# Patient Record
Sex: FEMALE | Race: WHITE | Employment: FULL TIME | RURAL
[De-identification: names, ages, dates, MRNs, and addresses within clinical notes are randomized per-mention and may not be internally consistent; named-entity substitution may affect disease eponyms.]

---

## 2017-04-11 ENCOUNTER — OFFICE VISIT (OUTPATIENT)
Dept: FAMILY MEDICINE CLINIC | Age: 67
End: 2017-04-11

## 2017-04-11 VITALS
OXYGEN SATURATION: 98 % | SYSTOLIC BLOOD PRESSURE: 131 MMHG | HEIGHT: 66 IN | WEIGHT: 129 LBS | HEART RATE: 68 BPM | DIASTOLIC BLOOD PRESSURE: 60 MMHG | RESPIRATION RATE: 16 BRPM | BODY MASS INDEX: 20.73 KG/M2 | TEMPERATURE: 97.6 F

## 2017-04-11 DIAGNOSIS — J06.9 URI WITH COUGH AND CONGESTION: Primary | ICD-10-CM

## 2017-04-11 RX ORDER — AZITHROMYCIN 250 MG/1
TABLET, FILM COATED ORAL
Qty: 6 TAB | Refills: 0 | Status: SHIPPED | OUTPATIENT
Start: 2017-04-11 | End: 2017-04-16

## 2017-04-11 RX ORDER — BENZONATATE 100 MG/1
100 CAPSULE ORAL
Qty: 21 CAP | Refills: 0 | Status: SHIPPED | OUTPATIENT
Start: 2017-04-11 | End: 2017-04-18

## 2017-04-11 NOTE — MR AVS SNAPSHOT
Visit Information Date & Time Provider Department Dept. Phone Encounter #  
 4/11/2017  2:00 PM Rishi April, Jeffery Goldman [phone] Upcoming Health Maintenance Date Due DTaP/Tdap/Td series (1 - Tdap) 11/9/1971 ZOSTER VACCINE AGE 60> 11/9/2010 GLAUCOMA SCREENING Q2Y 11/9/2015 Pneumococcal 65+ Low/Medium Risk (1 of 2 - PCV13) 11/9/2015 MEDICARE YEARLY EXAM 11/9/2015 BREAST CANCER SCRN MAMMOGRAM 8/10/2018 COLONOSCOPY 8/30/2018 Allergies as of 4/11/2017  Review Complete On: 4/11/2017 By: Aiden Casanova LPN Severity Noted Reaction Type Reactions Pcn [Penicillins] Medium 09/26/2012   Side Effect Rash Clarithromycin  02/10/2012    Nausea Only Sulfa (Sulfonamide Antibiotics)  02/10/2012    Rash Current Immunizations  Never Reviewed Name Date Influenza Vaccine 10/15/2014 Not reviewed this visit You Were Diagnosed With   
  
 Codes Comments URI with cough and congestion    -  Primary ICD-10-CM: J06.9 ICD-9-CM: 465.9 Vitals BP Pulse Temp Resp Height(growth percentile) Weight(growth percentile) 131/60 (BP 1 Location: Right arm, BP Patient Position: Sitting) 68 97.6 °F (36.4 °C) (Temporal) 16 5' 6\" (1.676 m) 129 lb (58.5 kg) LMP SpO2 BMI OB Status Smoking Status 09/26/2003 98% 20.82 kg/m2 Postmenopausal Never Smoker Vitals History BMI and BSA Data Body Mass Index Body Surface Area  
 20.82 kg/m 2 1.65 m 2 Preferred Pharmacy Pharmacy Name Phone [facility] [phone] [facility] [address] [address] [facility] [address] Your Updated Medication List  
  
   
This list is accurate as of: 4/11/17  2:40 PM.  Always use your most recent med list.  
  
  
  
  
 ascorbic acid (vitamin C) 500 mg tablet Commonly known as:  VITAMIN C Take 500 mg by mouth daily. azithromycin 250 mg tablet Commonly known as:  Kathyetta Fossa Take 2 tablets today, then take 1 tablet daily  
  
 benzonatate 100 mg capsule Commonly known as:  TESSALON PERLES Take 1 Cap by mouth three (3) times daily as needed for Cough for up to 7 days. HYDROcodone-acetaminophen 5-325 mg per tablet Commonly known as:  Kriste Aidan Take 1 Tab by mouth every eight (8) hours as needed for Pain. KRILL OIL PO Take  by mouth. SUPER B COMPLEX + C 150 mg Tab Generic drug:  vitamin b comp & c no. 4 Take  by mouth daily. VITAMIN D3 1,000 unit Cap Generic drug:  cholecalciferol Take  by mouth daily. Prescriptions Sent to Pharmacy Refills  
 benzonatate (TESSALON PERLES) 100 mg capsule 0 Sig: Take 1 Cap by mouth three (3) times daily as needed for Cough for up to 7 days. Class: Normal  
 Pharmacy: [address] [place] [address] Ph #: [phone] Route: Oral  
 azithromycin (ZITHROMAX) 250 mg tablet 0 Sig: Take 2 tablets today, then take 1 tablet daily Class: Normal  
 Pharmacy: [address] [place] [address] Ph #: [phone] Patient Instructions Upper Respiratory Infection (Cold): Care Instructions Your Care Instructions An upper respiratory infection, or URI, is an infection of the nose, sinuses, or throat. URIs are spread by coughs, sneezes, and direct contact. The common cold is the most frequent kind of URI. The flu and sinus infections are other kinds of URIs. Almost all URIs are caused by viruses. Antibiotics won't cure them. But you can treat most infections with home care. This may include drinking lots of fluids and taking over-the-counter pain medicine. You will probably feel better in 4 to 10 days. The doctor has checked you carefully, but problems can develop later. If you notice any problems or new symptoms, get medical treatment right away. Follow-up care is a key part of your treatment and safety.  Be sure to make and go to all appointments, and call your doctor if you are having problems. It's also a good idea to know your test results and keep a list of the medicines you take. How can you care for yourself at home? · To prevent dehydration, drink plenty of fluids, enough so that your urine is light yellow or clear like water. Choose water and other caffeine-free clear liquids until you feel better. If you have kidney, heart, or liver disease and have to limit fluids, talk with your doctor before you increase the amount of fluids you drink. · Take an over-the-counter pain medicine, such as acetaminophen (Tylenol), ibuprofen (Advil, Motrin), or naproxen (Aleve). Read and follow all instructions on the label. · Before you use cough and cold medicines, check the label. These medicines may not be safe for young children or for people with certain health problems. · Be careful when taking over-the-counter cold or flu medicines and Tylenol at the same time. Many of these medicines have acetaminophen, which is Tylenol. Read the labels to make sure that you are not taking more than the recommended dose. Too much acetaminophen (Tylenol) can be harmful. · Get plenty of rest. 
· Do not smoke or allow others to smoke around you. If you need help quitting, talk to your doctor about stop-smoking programs and medicines. These can increase your chances of quitting for good. When should you call for help? Call 911 anytime you think you may need emergency care. For example, call if: 
· You have severe trouble breathing. Call your doctor now or seek immediate medical care if: 
· You seem to be getting much sicker. · You have new or worse trouble breathing. · You have a new or higher fever. · You have a new rash. Watch closely for changes in your health, and be sure to contact your doctor if: 
· You have a new symptom, such as a sore throat, an earache, or sinus pain. · You cough more deeply or more often, especially if you notice more mucus or a change in the color of your mucus. · You do not get better as expected. Where can you learn more? Go to http://dinesh-blaise.info/. Enter S917 in the search box to learn more about \"Upper Respiratory Infection (Cold): Care Instructions. \" Current as of: June 30, 2016 Content Version: 11.2 © 8708-9989 [employer]. Care instructions adapted under license by [employer] (which disclaims liability or warranty for this information). If you have questions about a medical condition or this instruction, always ask your healthcare professional. [address] any warranty or liability for your use of this information. Introducing [facility]! Dear Tara Myers: Thank you for requesting a Pathbrite account. Our records indicate that you already have an active Pathbrite account. You can access your account anytime at [email or web address] Did you know that you can access your hospital and ER discharge instructions at any time in Pathbrite? You can also review all of your test results from your hospital stay or ER visit. Additional Information If you have questions, please visit the Frequently Asked Questions section of the Pathbrite website at [email or web address] Remember, Pathbrite is NOT to be used for urgent needs. For medical emergencies, dial 911. Now available from your iPhone and Android! Please provide this summary of care documentation to your next provider. Your primary care clinician is listed as Sonya Silva. If you have any questions after today's visit, please call [phone].

## 2017-04-11 NOTE — PATIENT INSTRUCTIONS

## 2017-04-11 NOTE — PROGRESS NOTES
Identified pt with two pt identifiers(name and ). Chief Complaint   Patient presents with    Follow-up     pt reports that she had the flu the end of March and she doesn't feel like she is getting any better - reports that she caught it from a co-wkr but was not formally dx    Cough    Cough with sputum     is green in color         Health Maintenance Due   Topic    DTaP/Tdap/Td series (1 - Tdap)    ZOSTER VACCINE AGE 60>     GLAUCOMA SCREENING Q2Y     Pneumococcal 65+ Low/Medium Risk (1 of 2 - PCV13)    MEDICARE YEARLY EXAM        Wt Readings from Last 3 Encounters:   17 129 lb (58.5 kg)   16 130 lb (59 kg)   11/06/15 130 lb (59 kg)     Temp Readings from Last 3 Encounters:   16 97.8 °F (36.6 °C) (Oral)   11/06/15 98 °F (36.7 °C)   08/04/15 97.7 °F (36.5 °C) (Oral)     BP Readings from Last 3 Encounters:   16 126/49   11/06/15 112/52   08/04/15 130/62     Pulse Readings from Last 3 Encounters:   16 (!) 56   11/06/15 (!) 59   08/04/15 64         Learning Assessment:  :     Learning Assessment 2013   PRIMARY LEARNER Patient   HIGHEST LEVEL OF EDUCATION - PRIMARY LEARNER  2 YEARS OF COLLEGE   BARRIERS PRIMARY LEARNER NONE   CO-LEARNER CAREGIVER No   PRIMARY LANGUAGE ENGLISH   LEARNER PREFERENCE PRIMARY DEMONSTRATION     PICTURES   ANSWERED BY self   RELATIONSHIP SELF       Depression Screening:  :     PHQ 2 / 9, over the last two weeks 2016   Little interest or pleasure in doing things Not at all   Feeling down, depressed or hopeless Not at all   Total Score PHQ 2 0       Fall Risk Assessment:  :     Fall Risk Assessment, last 12 mths 2016   Able to walk? Yes   Fall in past 12 months? No       Abuse Screening:  :     Abuse Screening Questionnaire 2016   Do you ever feel afraid of your partner? N   Are you in a relationship with someone who physically or mentally threatens you? N   Is it safe for you to go home?  Y       Coordination of Care Questionnaire:  : 1) Have you been to an emergency room, urgent care clinic since your last visit? no   Hospitalized since your last visit? no             2) Have you seen or consulted any other health care providers outside of [address] [place] [address] since your last visit? yes, no  (Include any pap smears or colon screenings in this section.)    3) Do you have an Advance Directive on file? Yes, pt said she thinks she does on file at home  Are you interested in receiving information about Advance Directives? no    Patient is accompanied by self. Reviewed record in preparation for visit and have obtained necessary documentation. Medication reconciliation up to date and corrected with patient at this time.

## 2017-04-24 NOTE — PROGRESS NOTES
CC:  Chief Complaint   Patient presents with    Follow-up     pt reports that she had the flu the end of March and she doesn't feel like she is getting any better - reports that she caught it from a co-wkr but was not formally dx    Cough    Cough with sputum     is green in color     HISTORY OF [place]  Fina Iqbal is a 77 y.o. female. HPI Comments: Who presents today with worsening URI symptoms. She reports that she is coughing up large amounts of green colored sputum. She had been diagnosed with the flu in March and feels that her symptoms have progressed. Having tactile fevers and cough is persistent. Follow-up     Cough     Cough with sputum         ROS:  Review of Systems   Respiratory: Positive for cough and sputum production. All other systems reviewed and are negative. OBJECTIVE:  Visit Vitals    /60 (BP 1 Location: Right arm, BP Patient Position: Sitting)    Pulse 68    Temp 97.6 °F (36.4 °C) (Temporal)    Resp 16    Ht 5' 6\" (1.676 m)    Wt 129 lb (58.5 kg)    LMP 09/26/2003    SpO2 98%    BMI 20.82 kg/m2   Physical Exam   Constitutional: She is oriented to person, place, and time. Fatigued appearing. Cardiovascular: Normal rate and regular rhythm. Pulmonary/Chest: Effort normal. She has wheezes (few scattered and expiratory). Musculoskeletal: Normal range of motion. Neurological: She is alert and oriented to person, place, and time. Nursing note and vitals reviewed. ASSESSMENT and PLAN    ICD-10-CM ICD-9-CM    1. URI with cough and congestion J06.9 465.9 benzonatate (TESSALON PERLES) 100 mg capsule      azithromycin (ZITHROMAX) 250 mg tablet     66F who presents today with worsening URI, cough and congestion. She is having low grade, but persistent tactile fevers and has a sputum color change. Will treat as outlined above. Pt verbalizes understanding of plan of care and denies further questions or concerns at this time.     Follow-up Disposition:  Return if symptoms worsen or fail to improve.

## 2017-08-09 ENCOUNTER — OFFICE VISIT (OUTPATIENT)
Dept: FAMILY MEDICINE CLINIC | Age: 67
End: 2017-08-09

## 2017-08-09 VITALS
WEIGHT: 129.6 LBS | SYSTOLIC BLOOD PRESSURE: 107 MMHG | TEMPERATURE: 97.7 F | RESPIRATION RATE: 20 BRPM | BODY MASS INDEX: 20.83 KG/M2 | HEART RATE: 60 BPM | DIASTOLIC BLOOD PRESSURE: 53 MMHG | OXYGEN SATURATION: 99 % | HEIGHT: 66 IN

## 2017-08-09 DIAGNOSIS — Z00.00 ROUTINE MEDICAL EXAM: ICD-10-CM

## 2017-08-09 DIAGNOSIS — E55.9 VITAMIN D DEFICIENCY: ICD-10-CM

## 2017-08-09 DIAGNOSIS — Z23 ENCOUNTER FOR IMMUNIZATION: ICD-10-CM

## 2017-08-09 DIAGNOSIS — Z00.00 ROUTINE ADULT HEALTH MAINTENANCE: Primary | ICD-10-CM

## 2017-08-09 LAB
BILIRUB UR QL STRIP: NEGATIVE
GLUCOSE UR-MCNC: NEGATIVE MG/DL
KETONES P FAST UR STRIP-MCNC: NEGATIVE MG/DL
PH UR STRIP: 7 [PH] (ref 4.6–8)
PROT UR QL STRIP: NEGATIVE MG/DL
SP GR UR STRIP: 1.01 (ref 1–1.03)
UA UROBILINOGEN AMB POC: NORMAL (ref 0.2–1)
URINALYSIS CLARITY POC: CLEAR
URINALYSIS COLOR POC: YELLOW
URINE BLOOD POC: NEGATIVE
URINE LEUKOCYTES POC: NEGATIVE
URINE NITRITES POC: NEGATIVE

## 2017-08-09 NOTE — PROGRESS NOTES
Subjective:   77 y.o. female for Well Woman Check. Her gyne and breast care is done elsewhere by her Ob-Gyne physician. Patient Active Problem List    Diagnosis Date Noted    Hypercholesterolemia 06/28/2013    DDD (degenerative disc disease), lumbar 02/10/2012     Current Outpatient Prescriptions   Medication Sig Dispense Refill    pneumococcal 13 sonia conj dip (PREVNAR-13) 0.5 mL syrg injection 0.5 mL by IntraMUSCular route once for 1 dose. 0.5 mL 0    varicella zoster vacine live (ZOSTAVAX) 19,400 unit/0.65 mL susr injection 1 Vial by SubCUTAneous route once for 1 dose. 0.65 mL 0    ascorbic acid, vitamin C, (VITAMIN C) 500 mg tablet Take 500 mg by mouth daily.  Cholecalciferol, Vitamin D3, (VITAMIN D3) 1,000 unit cap Take  by mouth daily.  vitamin b comp & c no.4 (SUPER B COMPLEX + C) 150 mg tab Take  by mouth daily.  KRILL OIL PO Take  by mouth. Allergies   Allergen Reactions    Pcn [Penicillins] Rash    Clarithromycin Nausea Only    Sulfa (Sulfonamide Antibiotics) Rash     Past Medical History:   Diagnosis Date    Back pain     Chronic pain     lower back and into left hip     Past Surgical History:   Procedure Laterality Date    HX ORTHOPAEDIC  1995    L5-S1 discectomy laminectomy    HX TONSIL AND ADENOIDECTOMY      HX TUBAL LIGATION      [place] REMOVE LUMBAR ARTIFICIAL DISC, SINGLE      VASCULAR SURGERY PROCEDURE UNLIST Right     vein surgery     Family History   Problem Relation Age of Onset    Cancer Mother     No Known Problems Father      Social History   Substance Use Topics    Smoking status: Never Smoker    Smokeless tobacco: Never Used    Alcohol use No             Specific concerns today: discuss immunization up date. C/O increased hair loss. Taking Activia yogurt daily for bowel [place]. She went [facility] Filemon Wade this year. Will be seeing Dr. Awilda Reynolds for leg varicose veins. Review of Systems  Pertinent items are noted in HPI.     Objective:   Blood pressure 107/53, pulse 60, temperature 97.7 °F (36.5 °C), temperature source Oral, resp. rate 20, height 5' 6\" (1.676 m), weight 129 lb 9.6 oz (58.8 kg), last menstrual period 09/26/2003, SpO2 99 %. Physical Examination:   General appearance - alert, well appearing, and in no distress  Mental status - alert, oriented to person, place, and time  Ears - bilateral TM's and external ear canals normal  Nose - normal and patent, no erythema, discharge or polyps  Mouth - mucous membranes moist, pharynx normal without lesions  Neck - supple, no significant adenopathy  Chest - clear to auscultation, no wheezes, rales or rhonchi, symmetric air entry  Heart - normal rate, regular rhythm, normal S1, S2, no murmurs, rubs, clicks or gallops  Abdomen - soft, nontender, nondistended, no masses or organomegaly  Neurological - alert, oriented, normal speech, no focal findings or movement disorder noted  Musculoskeletal - no joint tenderness, deformity or swelling     Assessment/Plan:   Well woman exam.  increase physical activity, follow low fat diet, follow low salt diet, routine labs ordered    ICD-10-CM ICD-9-CM    1. Routine adult health maintenance Z00.00 V70.0 CBC W/O DIFF      METABOLIC PANEL, COMPREHENSIVE      LIPID PANEL      TSH 3RD GENERATION      HEMOGLOBIN A1C WITH EAG   2. Encounter for immunization Z23 V03.89 pneumococcal 13 sonia conj dip (PREVNAR-13) 0.5 mL syrg injection      varicella zoster vacine live (ZOSTAVAX) 19,400 unit/0.65 mL susr injection   3. Vitamin D deficiency E55.9 268.9 VITAMIN D, 25 HYDROXY   4. Routine medical exam Z00.00 V70.0 AMB POC URINALYSIS DIP STICK AUTO W/O MICRO     Patient Instructions        Preventing Osteoporosis: Care Instructions  Your Care Instructions  Osteoporosis means the bones are weak and thin enough that they can break easily. The older you are, the more likely you are to get osteoporosis.  But with plenty of calcium, vitamin D, and exercise, you can help prevent osteoporosis. The preteen and teen years are a key time for bone building. With the help of calcium, vitamin D, and exercise in those early years and beyond, the bones reach their peak density and strength by age 27. After age 27, your bones naturally start to thin and weaken. The stronger your bones are at around age 27, the lower your risk for osteoporosis. But no matter what your age and risk are, your bones still need calcium, vitamin D, and exercise to stay strong. Also avoid smoking, and limit alcohol. Smoking and heavy alcohol use can make your bones thinner. Talk to your doctor about any special risks you might have, such as having a close relative with osteoporosis or taking a medicine that can weaken bones. Your doctor can tell you the best ways to protect your bones from thinning. Follow-up care is a key part of your treatment and safety. Be sure to make and go to all appointments, and call your doctor if you are having problems. It's also a good idea to know your test results and keep a list of the medicines you take. How can you care for yourself at home? · Get enough calcium and vitamin D. The [place] of Medicine recommends adults younger than age 46 need 1,000 mg of calcium and 600 IU of vitamin D each day. Women ages 46 to 79 need 1,200 mg of calcium and 600 IU of vitamin D each day. Men ages 46 to 79 need 1,000 mg of calcium and 600 IU of vitamin D each day. Adults 71 and older need 1,200 mg of calcium and 800 IU of vitamin D each day. ¨ Eat foods rich in calcium, like yogurt, cheese, milk, and dark green vegetables. ¨ Eat foods rich in vitamin D, like eggs, fatty fish, cereal, and fortified milk. ¨ Get some sunshine. Your body uses sunshine to make its own vitamin D. The safest time to be out in the sun is before 10 a.m. or after 3 p.m. Avoid getting sunburned. Sunburn can increase your risk of skin cancer. ¨ Talk to your doctor about taking a calcium plus vitamin D supplement.  Ask about what type of calcium is right for you, and how much to take at a time. Adults ages 23 to 48 should not get more than 2,500 mg of calcium and 4,000 IU of vitamin D each day, whether it is from supplements and/or food. Adults ages 46 and older should not get more than 2,000 mg of calcium and 4,000 IU of vitamin D each day from supplements and/or food. · Get regular bone-building exercise. Weight-bearing and resistance exercises keep bones healthy by working the muscles and bones against gravity. Start out at an exercise level that feels right for you. Add a little at a time until you can do the following:  ¨ Do 30 minutes of weight-bearing exercise on most days of the week. Walking, jogging, stair climbing, and dancing are good choices. ¨ Do resistance exercises with weights or elastic bands 2 to 3 days a week. · Limit alcohol. Drink no more than 1 alcohol drink a day if you are a woman. Drink no more than 2 alcohol drinks a day if you are a man. · Do not smoke. Smoking can make bones thin faster. If you need help quitting, talk to your doctor about stop-smoking programs and medicines. These can increase your chances of quitting for good. When should you call for help? Watch closely for changes in your health, and be sure to contact your doctor if you have any problems. Where can you learn more? Go to http://dinesh-blaise.info/. Enter G535 in the search box to learn more about \"Preventing Osteoporosis: Care Instructions. \"  Current as of: August 9, 2016  Content Version: 11.3  © 5460-1769 [employer]. Care instructions adapted under license by [employer] (which disclaims liability or warranty for this information). If you have questions about a medical condition or this instruction, always ask your healthcare professional. [address] any warranty or liability for your use of this information.

## 2017-08-09 NOTE — MR AVS SNAPSHOT
Visit Information Date & Time Provider Department Dept. Phone Encounter #  
 2/5/5056 63:42 AM Linda RussellJeffery farah [phone] [id number] Upcoming Health Maintenance Date Due ZOSTER VACCINE AGE 60> 9/9/2010 GLAUCOMA SCREENING Q2Y 11/9/2015 Pneumococcal 65+ Low/Medium Risk (1 of 2 - PCV13) 11/9/2015 INFLUENZA AGE 9 TO ADULT 10/31/2017* MEDICARE YEARLY EXAM 8/10/2018 BREAST CANCER SCRN MAMMOGRAM 8/10/2018 COLONOSCOPY 8/30/2018 DTaP/Tdap/Td series (2 - Td) 8/9/2027 *Topic was postponed. The date shown is not the original due date. Allergies as of 8/9/2017  Review Complete On: 2/5/2565 By: Linda Stewart MD  
  
 Severity Noted Reaction Type Reactions Pcn [Penicillins] Medium 09/26/2012   Side Effect Rash Clarithromycin  02/10/2012    Nausea Only Sulfa (Sulfonamide Antibiotics)  02/10/2012    Rash Current Immunizations  Reviewed on 8/9/2017 Name Date Influenza Vaccine 10/15/2014 Reviewed by Linda Stewart MD on 8/9/2017 at 10:29 AM  
You Were Diagnosed With   
  
 Codes Comments Routine adult health maintenance    -  Primary ICD-10-CM: Z00.00 ICD-9-CM: V70.0 Encounter for immunization     ICD-10-CM: X85 ICD-9-CM: V03.89 Vitamin D deficiency     ICD-10-CM: E55.9 ICD-9-CM: 268.9 Vitals BP Pulse Temp Resp Height(growth percentile) Weight(growth percentile) 107/53 (BP 1 Location: Left arm, BP Patient Position: Sitting) 60 97.7 °F (36.5 °C) (Oral) 20 5' 6\" (1.676 m) 129 lb 9.6 oz (58.8 kg) LMP SpO2 BMI OB Status Smoking Status 09/26/2003 99% 20.92 kg/m2 Postmenopausal Never Smoker BMI and BSA Data Body Mass Index Body Surface Area  
 20.92 kg/m 2 1.65 m 2 Preferred Pharmacy Pharmacy Name Phone [facility] [phone] Your Updated Medication List  
  
   
 This list is accurate as of: 17 10:59 AM.  Always use your most recent med list.  
  
  
  
  
 ascorbic acid (vitamin C) 500 mg tablet Commonly known as:  VITAMIN C Take 500 mg by mouth daily. KRILL OIL PO Take  by mouth.  
  
 pneumococcal 13 sonia conj dip 0.5 mL Syrg injection Commonly known as:  PREVNAR-13  
0.5 mL by IntraMUSCular route once for 1 dose. SUPER B COMPLEX + C 150 mg Tab Generic drug:  vitamin b comp & c no. 4 Take  by mouth daily. varicella zoster vacine live 19,400 unit/0.65 mL Susr injection Commonly known as:  ZOSTAVAX  
1 Vial by SubCUTAneous route once for 1 dose. VITAMIN D3 1,000 unit Cap Generic drug:  cholecalciferol Take  by mouth daily. Prescriptions Printed Refills  
 pneumococcal 13 sonia conj dip (PREVNAR-13) 0.5 mL syrg injection 0 Si.5 mL by IntraMUSCular route once for 1 dose. Class: Print Route: IntraMUSCular  
 varicella zoster vacine live (ZOSTAVAX) 19,400 unit/0.65 mL susr injection 0 Si Vial by SubCUTAneous route once for 1 dose. Class: Print Route: SubCUTAneous We Performed the Following CBC W/O DIFF [07899 CPT(R)] HEMOGLOBIN A1C WITH EAG [19252 CPT(R)] LIPID PANEL [41538 CPT(R)] METABOLIC PANEL, COMPREHENSIVE [27943 CPT(R)] TSH 3RD GENERATION [25478 CPT(R)] VITAMIN D, 25 HYDROXY [id number] CPT(R)] To-Do List   
 2017 10:30 AM  
  Appointment with [facility] 1 at [place] [address] Mammography ([phone]) Shower or bathe using soap and water. Do not use deodorant, powder, perfumes, or lotion the day of your exam.  If your prior mammograms were not performed at [facility] please bring films with you or forward prior images 2 days before your procedure. Check in at registration 15min before your appointment time unless you were instructed to do otherwise.   A script is not necessary, but if you have one, please bring it on the day of the mammogram or have it faxed to the department. [facility] [phone] [facility]  [phone] [address] [place] [address]  [phone] [facility] [phone] [place] [address] [facility] [phone] Patient Instructions Preventing Osteoporosis: Care Instructions Your Care Instructions Osteoporosis means the bones are weak and thin enough that they can break easily. The older you are, the more likely you are to get osteoporosis. But with plenty of calcium, vitamin D, and exercise, you can help prevent osteoporosis. The preteen and teen years are a key time for bone building. With the help of calcium, vitamin D, and exercise in those early years and beyond, the bones reach their peak density and strength by age 27. After age 27, your bones naturally start to thin and weaken. The stronger your bones are at around age 27, the lower your risk for osteoporosis. But no matter what your age and risk are, your bones still need calcium, vitamin D, and exercise to stay strong. Also avoid smoking, and limit alcohol. Smoking and heavy alcohol use can make your bones thinner. Talk to your doctor about any special risks you might have, such as having a close relative with osteoporosis or taking a medicine that can weaken bones. Your doctor can tell you the best ways to protect your bones from thinning. Follow-up care is a key part of your treatment and safety. Be sure to make and go to all appointments, and call your doctor if you are having problems. It's also a good idea to know your test results and keep a list of the medicines you take. How can you care for yourself at home? · Get enough calcium and vitamin D. The [place] of Medicine recommends adults younger than age 46 need 1,000 mg of calcium and 600 IU of vitamin D each day. Women ages 46 to 79 need 1,200 mg of calcium and 600 IU of vitamin D each day.  Men ages 46 to 79 need 1,000 mg of calcium and 600 IU of vitamin D each day. Adults 71 and older need 1,200 mg of calcium and 800 IU of vitamin D each day. ¨ Eat foods rich in calcium, like yogurt, cheese, milk, and dark green vegetables. ¨ Eat foods rich in vitamin D, like eggs, fatty fish, cereal, and fortified milk. ¨ Get some sunshine. Your body uses sunshine to make its own vitamin D. The safest time to be out in the sun is before 10 a.m. or after 3 p.m. Avoid getting sunburned. Sunburn can increase your risk of skin cancer. ¨ Talk to your doctor about taking a calcium plus vitamin D supplement. Ask about what type of calcium is right for you, and how much to take at a time. Adults ages 23 to 48 should not get more than 2,500 mg of calcium and 4,000 IU of vitamin D each day, whether it is from supplements and/or food. Adults ages 46 and older should not get more than 2,000 mg of calcium and 4,000 IU of vitamin D each day from supplements and/or food. · Get regular bone-building exercise. Weight-bearing and resistance exercises keep bones healthy by working the muscles and bones against gravity. Start out at an exercise level that feels right for you. Add a little at a time until you can do the following: ¨ Do 30 minutes of weight-bearing exercise on most days of the week. Walking, jogging, stair climbing, and dancing are good choices. ¨ Do resistance exercises with weights or elastic bands 2 to 3 days a week. · Limit alcohol. Drink no more than 1 alcohol drink a day if you are a woman. Drink no more than 2 alcohol drinks a day if you are a man. · Do not smoke. Smoking can make bones thin faster. If you need help quitting, talk to your doctor about stop-smoking programs and medicines. These can increase your chances of quitting for good. When should you call for help? Watch closely for changes in your health, and be sure to contact your doctor if you have any problems. Where can you learn more? Go to http://david.info/. Enter A760 in the search box to learn more about \"Preventing Osteoporosis: Care Instructions. \" Current as of: August 9, 2016 Content Version: 11.3 © 1315-7972 PDV. Care instructions adapted under license by [employer] (which disclaims liability or warranty for this information). If you have questions about a medical condition or this instruction, always ask your healthcare professional. [address] any warranty or liability for your use of this information. Introducing [facility]! Dear Jose sifuentes: Thank you for requesting a Gamar account. Our records indicate that you already have an active Gamar account. You can access your account anytime at [email or web address] Did you know that you can access your hospital and ER discharge instructions at any time in Gamar? You can also review all of your test results from your hospital stay or ER visit. Additional Information If you have questions, please visit the Frequently Asked Questions section of the Gamar website at [email or web address] Remember, Gamar is NOT to be used for urgent needs. For medical emergencies, dial 911. Now available from your iPhone and Android! Please provide this summary of care documentation to your next provider. Your primary care clinician is listed as Laya Ross. If you have any questions after today's visit, please call [phone].

## 2017-08-09 NOTE — PATIENT INSTRUCTIONS
Preventing Osteoporosis: Care Instructions  Your Care Instructions  Osteoporosis means the bones are weak and thin enough that they can break easily. The older you are, the more likely you are to get osteoporosis. But with plenty of calcium, vitamin D, and exercise, you can help prevent osteoporosis. The preteen and teen years are a key time for bone building. With the help of calcium, vitamin D, and exercise in those early years and beyond, the bones reach their peak density and strength by age 27. After age 27, your bones naturally start to thin and weaken. The stronger your bones are at around age 27, the lower your risk for osteoporosis. But no matter what your age and risk are, your bones still need calcium, vitamin D, and exercise to stay strong. Also avoid smoking, and limit alcohol. Smoking and heavy alcohol use can make your bones thinner. Talk to your doctor about any special risks you might have, such as having a close relative with osteoporosis or taking a medicine that can weaken bones. Your doctor can tell you the best ways to protect your bones from thinning. Follow-up care is a key part of your treatment and safety. Be sure to make and go to all appointments, and call your doctor if you are having problems. It's also a good idea to know your test results and keep a list of the medicines you take. How can you care for yourself at home? · Get enough calcium and vitamin D. The [place] of Medicine recommends adults younger than age 46 need 1,000 mg of calcium and 600 IU of vitamin D each day. Women ages 46 to 79 need 1,200 mg of calcium and 600 IU of vitamin D each day. Men ages 46 to 79 need 1,000 mg of calcium and 600 IU of vitamin D each day. Adults 71 and older need 1,200 mg of calcium and 800 IU of vitamin D each day. ¨ Eat foods rich in calcium, like yogurt, cheese, milk, and dark green vegetables.   ¨ Eat foods rich in vitamin D, like eggs, fatty fish, cereal, and fortified milk.  ¨ Get some sunshine. Your body uses sunshine to make its own vitamin D. The safest time to be out in the sun is before 10 a.m. or after 3 p.m. Avoid getting sunburned. Sunburn can increase your risk of skin cancer. ¨ Talk to your doctor about taking a calcium plus vitamin D supplement. Ask about what type of calcium is right for you, and how much to take at a time. Adults ages 23 to 48 should not get more than 2,500 mg of calcium and 4,000 IU of vitamin D each day, whether it is from supplements and/or food. Adults ages 46 and older should not get more than 2,000 mg of calcium and 4,000 IU of vitamin D each day from supplements and/or food. · Get regular bone-building exercise. Weight-bearing and resistance exercises keep bones healthy by working the muscles and bones against gravity. Start out at an exercise level that feels right for you. Add a little at a time until you can do the following:  ¨ Do 30 minutes of weight-bearing exercise on most days of the week. Walking, jogging, stair climbing, and dancing are good choices. ¨ Do resistance exercises with weights or elastic bands 2 to 3 days a week. · Limit alcohol. Drink no more than 1 alcohol drink a day if you are a woman. Drink no more than 2 alcohol drinks a day if you are a man. · Do not smoke. Smoking can make bones thin faster. If you need help quitting, talk to your doctor about stop-smoking programs and medicines. These can increase your chances of quitting for good. When should you call for help? Watch closely for changes in your health, and be sure to contact your doctor if you have any problems. Where can you learn more? Go to http://dinesh-blaise.info/. Enter E611 in the search box to learn more about \"Preventing Osteoporosis: Care Instructions. \"  Current as of: August 9, 2016  Content Version: 11.3  © 8469-8412 [employer].  Care instructions adapted under license by [employer] (which disclaims liability or warranty for this information). If you have questions about a medical condition or this instruction, always ask your healthcare professional. [address] any warranty or liability for your use of this information.

## 2017-08-09 NOTE — LETTER
8/14/2017 5:48 PM 
 
Ms. Garrison Faes [address] [place] [id number] Dear Bladimir Frank: Please find your most recent results below. Resulted Orders CBC W/O DIFF Result Value Ref Range WBC 5.1 3.4 - 10.8 x10E3/uL  
 RBC 4.40 3.77 - 5.28 x10E6/uL HGB 12.9 11.1 - 15.9 g/dL HCT 40.5 34.0 - 46.6 % MCV 92 79 - 97 fL  
 MCH 29.3 26.6 - 33.0 pg  
 MCHC 31.9 31.5 - 35.7 g/dL  
 RDW 13.2 12.3 - 15.4 % PLATELET 101 339 - 864 x10E3/uL Narrative Performed at:  [address] [address]  [id number] : Maria Branch MD, Phone:  [phone] METABOLIC PANEL, COMPREHENSIVE Result Value Ref Range Glucose 82 65 - 99 mg/dL BUN 15 8 - 27 mg/dL Creatinine 0.67 0.57 - 1.00 mg/dL GFR est non-AA 92 >59 mL/min/1.73 GFR est  >59 mL/min/1.73  
 BUN/Creatinine ratio 22 12 - 28 Sodium 142 134 - 144 mmol/L Potassium 4.8 3.5 - 5.2 mmol/L Chloride 99 96 - 106 mmol/L  
 CO2 27 18 - 29 mmol/L Calcium 9.9 8.7 - 10.3 mg/dL Protein, total 7.4 6.0 - 8.5 g/dL Albumin 4.7 3.6 - 4.8 g/dL GLOBULIN, TOTAL 2.7 1.5 - 4.5 g/dL A-G Ratio 1.7 1.2 - 2.2 Bilirubin, total 0.3 0.0 - 1.2 mg/dL Alk. phosphatase 72 39 - 117 IU/L  
 AST (SGOT) 29 0 - 40 IU/L  
 ALT (SGPT) 22 0 - 32 IU/L Narrative Performed at:  [address] [address]  [id number] : Maria Branch MD, Phone:  [phone] LIPID PANEL Result Value Ref Range Cholesterol, total 222 (H) 100 - 199 mg/dL Triglyceride 79 0 - 149 mg/dL HDL Cholesterol 85 >39 mg/dL VLDL, calculated 16 5 - 40 mg/dL LDL, calculated 121 (H) 0 - 99 mg/dL Narrative Performed at:  [address] [address]  [id number] : Maria Branch MD, Phone:  [phone] TSH 3RD GENERATION Result Value Ref Range TSH 2.200 0.450 - 4.500 uIU/mL Narrative Performed at:  [address]  [id number] : Milton Rogers MD, Phone:  [phone] VITAMIN D, 25 HYDROXY Result Value Ref Range VITAMIN D, 25-HYDROXY 26.3 (L) 30.0 - 100.0 ng/mL Comment:  
   Vitamin D deficiency has been defined by the [facility] practice guideline as a 
level of serum 25-OH vitamin D less than 20 ng/mL (1,2). The Endocrine Society went on to further define vitamin D 
insufficiency as a level between 21 and 29 ng/mL (2). 1. IOM ([employer] of Medicine). 2010. Dietary reference 
   intakes for calcium and D. [facility]: The 
   The [employer] (AMI). 2. Glenna MF, Federico ARIZMENDI, Breanna DOE, et al. 
   Evaluation, treatment, and prevention of vitamin D 
   deficiency: an Endocrine Society clinical practice 
   guideline. JCEM. 2011 Jul; 96(7):1911-30. Narrative Performed at:  [address]  [id number] : Milton Rogers MD, Phone:  [phone] HEMOGLOBIN A1C WITH EAG Result Value Ref Range Hemoglobin A1c 5.8 (H) 4.8 - 5.6 % Comment:  
            Pre-diabetes: 5.7 - 6.4 Diabetes: >6.4 Glycemic control for adults with diabetes: <7.0 Estimated average glucose 120 mg/dL Narrative Performed at:  [address]  [id number] : Milton Rogers MD, Phone:  [phone] AMB POC URINALYSIS DIP STICK AUTO W/O MICRO Result Value Ref Range Color (UA POC) Yellow Clarity (UA POC) Clear Glucose (UA POC) Negative Negative Bilirubin (UA POC) Negative Negative Ketones (UA POC) Negative Negative Specific gravity (UA POC) 1.015 1.001 - 1.035 Blood (UA POC) Negative Negative pH (UA POC) 7.0 4.6 - 8.0 Protein (UA POC) Negative Negative mg/dL Urobilinogen (UA POC) 0.2 mg/dL 0.2 - 1 Nitrites (UA POC) Negative Negative Leukocyte esterase (UA POC) Negative Negative CVD REPORT Result Value Ref Range INTERPRETATION Note Comment:  
   Supplement report is available. Narrative Performed at:  [address] [place] [address]  [id number] : Sabrina Zepeda PhD, Phone:  [phone] RECOMMENDATIONS: 
Labs look good except for persistent elevated cholesterol numbers. Watch diet.  Vit D is low. Double up on dose of supplement to Vit D 2,000 units daily. Normal blood sugar, liver, kidney, and thyroid tests. Normal blood counts. Please call me if you have any questions: [phone] Sincerely, Jay Bardales MD

## 2017-08-09 NOTE — PROGRESS NOTES
Identified pt with two pt identifiers(name and ). Chief Complaint   Patient presents with    Physical        Health Maintenance Due   Topic    ZOSTER VACCINE AGE 60>     GLAUCOMA SCREENING Q2Y     Pneumococcal 65+ Low/Medium Risk (1 of 2 - PCV13)    INFLUENZA AGE 9 TO ADULT    Eye exam Dr Adolfo Mendoza   Flu vaccine given at work     Wt Readings from Last 3 Encounters:   17 129 lb 9.6 oz (58.8 kg)   17 129 lb (58.5 kg)   16 130 lb (59 kg)     Temp Readings from Last 3 Encounters:   17 97.7 °F (36.5 °C) (Oral)   17 97.6 °F (36.4 °C) (Temporal)   16 97.8 °F (36.6 °C) (Oral)     BP Readings from Last 3 Encounters:   17 107/53   17 131/60   16 126/49     Pulse Readings from Last 3 Encounters:   17 60   17 68   16 (!) 56         Learning Assessment:  :     Learning Assessment 2013   PRIMARY LEARNER Patient   HIGHEST LEVEL OF EDUCATION - PRIMARY LEARNER  2 YEARS OF COLLEGE   BARRIERS PRIMARY LEARNER NONE   CO-LEARNER CAREGIVER No   PRIMARY LANGUAGE ENGLISH   LEARNER PREFERENCE PRIMARY DEMONSTRATION     PICTURES   ANSWERED BY self   RELATIONSHIP SELF       Depression Screening:  :     PHQ over the last two weeks 2017   Little interest or pleasure in doing things Not at all   Feeling down, depressed or hopeless Not at all   Total Score PHQ 2 0       Fall Risk Assessment:  :     Fall Risk Assessment, last 12 mths 2017   Able to walk? Yes   Fall in past 12 months? No       Abuse Screening:  :     Abuse Screening Questionnaire 2017   Do you ever feel afraid of your partner? N N   Are you in a relationship with someone who physically or mentally threatens you? N N   Is it safe for you to go home?  Y Y       Coordination of Care Questionnaire:  :     1) Have you been to an emergency room, urgent care clinic since your last visit? no   Hospitalized since your last visit? no             2) Have you seen or consulted any other health care providers outside of [address] [place] [address] since your last visit? yes GYN -Dr Nawaf Khan (Include any pap smears or colon screenings in this section.)    3) Do you have an Advance Directive on file? No She has her own    Are you interested in receiving information about Advance Directives? no    Reviewed record in preparation for visit and have obtained necessary documentation. Medication reconciliation up to date and corrected with patient at this time.      Results for orders placed or performed in visit on 08/09/17   AMB POC URINALYSIS DIP STICK AUTO W/O MICRO   Result Value Ref Range    Color (UA POC) Yellow     Clarity (UA POC) Clear     Glucose (UA POC) Negative Negative    Bilirubin (UA POC) Negative Negative    Ketones (UA POC) Negative Negative    Specific gravity (UA POC) 1.015 1.001 - 1.035    Blood (UA POC) Negative Negative    pH (UA POC) 7.0 4.6 - 8.0    Protein (UA POC) Negative Negative mg/dL    Urobilinogen (UA POC) 0.2 mg/dL 0.2 - 1    Nitrites (UA POC) Negative Negative    Leukocyte esterase (UA POC) Negative Negative

## 2017-08-10 LAB
25(OH)D3+25(OH)D2 SERPL-MCNC: 26.3 NG/ML (ref 30–100)
ALBUMIN SERPL-MCNC: 4.7 G/DL (ref 3.6–4.8)
ALBUMIN/GLOB SERPL: 1.7 {RATIO} (ref 1.2–2.2)
ALP SERPL-CCNC: 72 IU/L (ref 39–117)
ALT SERPL-CCNC: 22 IU/L (ref 0–32)
AST SERPL-CCNC: 29 IU/L (ref 0–40)
BILIRUB SERPL-MCNC: 0.3 MG/DL (ref 0–1.2)
BUN SERPL-MCNC: 15 MG/DL (ref 8–27)
BUN/CREAT SERPL: 22 (ref 12–28)
CALCIUM SERPL-MCNC: 9.9 MG/DL (ref 8.7–10.3)
CHLORIDE SERPL-SCNC: 99 MMOL/L (ref 96–106)
CHOLEST SERPL-MCNC: 222 MG/DL (ref 100–199)
CO2 SERPL-SCNC: 27 MMOL/L (ref 18–29)
CREAT SERPL-MCNC: 0.67 MG/DL (ref 0.57–1)
ERYTHROCYTE [DISTWIDTH] IN BLOOD BY AUTOMATED COUNT: 13.2 % (ref 12.3–15.4)
EST. AVERAGE GLUCOSE BLD GHB EST-MCNC: 120 MG/DL
GLOBULIN SER CALC-MCNC: 2.7 G/DL (ref 1.5–4.5)
GLUCOSE SERPL-MCNC: 82 MG/DL (ref 65–99)
HBA1C MFR BLD: 5.8 % (ref 4.8–5.6)
HCT VFR BLD AUTO: 40.5 % (ref 34–46.6)
HDLC SERPL-MCNC: 85 MG/DL
HGB BLD-MCNC: 12.9 G/DL (ref 11.1–15.9)
INTERPRETATION, 910389: NORMAL
LDLC SERPL CALC-MCNC: 121 MG/DL (ref 0–99)
MCH RBC QN AUTO: 29.3 PG (ref 26.6–33)
MCHC RBC AUTO-ENTMCNC: 31.9 G/DL (ref 31.5–35.7)
MCV RBC AUTO: 92 FL (ref 79–97)
PLATELET # BLD AUTO: 273 X10E3/UL (ref 150–379)
POTASSIUM SERPL-SCNC: 4.8 MMOL/L (ref 3.5–5.2)
PROT SERPL-MCNC: 7.4 G/DL (ref 6–8.5)
RBC # BLD AUTO: 4.4 X10E6/UL (ref 3.77–5.28)
SODIUM SERPL-SCNC: 142 MMOL/L (ref 134–144)
TRIGL SERPL-MCNC: 79 MG/DL (ref 0–149)
TSH SERPL DL<=0.005 MIU/L-ACNC: 2.2 UIU/ML (ref 0.45–4.5)
VLDLC SERPL CALC-MCNC: 16 MG/DL (ref 5–40)
WBC # BLD AUTO: 5.1 X10E3/UL (ref 3.4–10.8)

## 2017-08-14 ENCOUNTER — HOSPITAL ENCOUNTER (OUTPATIENT)
Dept: MAMMOGRAPHY | Age: 67
Discharge: HOME | End: 2017-08-14
Attending: OBSTETRICS & GYNECOLOGY
Payer: COMMERCIAL

## 2017-08-14 DIAGNOSIS — Z12.31 VISIT FOR SCREENING MAMMOGRAM: ICD-10-CM

## 2017-08-14 PROCEDURE — 77063 BREAST TOMOSYNTHESIS BI: CPT

## 2017-08-14 NOTE — PROGRESS NOTES
Labs look good except for persistent elevated cholesterol numbers. Watch diet. Vit D is low. Double up on dose of supplement to Vit D 2,000 units daily. Normal blood sugar, liver, kidney, and thyroid tests. Normal blood counts.

## 2017-08-16 ENCOUNTER — TELEPHONE (OUTPATIENT)
Dept: FAMILY MEDICINE CLINIC | Age: 67
End: 2017-08-16

## 2018-04-16 ENCOUNTER — HOSPITAL ENCOUNTER (OUTPATIENT)
Dept: GENERAL RADIOLOGY | Age: 68
Discharge: HOME | End: 2018-04-16
Attending: FAMILY MEDICINE
Payer: COMMERCIAL

## 2018-04-16 ENCOUNTER — OFFICE VISIT (OUTPATIENT)
Dept: FAMILY MEDICINE CLINIC | Age: 68
End: 2018-04-16

## 2018-04-16 VITALS
HEIGHT: 66 IN | BODY MASS INDEX: 19.19 KG/M2 | HEART RATE: 59 BPM | SYSTOLIC BLOOD PRESSURE: 117 MMHG | WEIGHT: 119.4 LBS | DIASTOLIC BLOOD PRESSURE: 80 MMHG | OXYGEN SATURATION: 99 % | RESPIRATION RATE: 18 BRPM | TEMPERATURE: 97.9 F

## 2018-04-16 DIAGNOSIS — R10.31 RIGHT GROIN PAIN: ICD-10-CM

## 2018-04-16 DIAGNOSIS — R10.31 RIGHT GROIN PAIN: Primary | ICD-10-CM

## 2018-04-16 DIAGNOSIS — R63.4 WEIGHT LOSS, NON-INTENTIONAL: ICD-10-CM

## 2018-04-16 PROCEDURE — 73502 X-RAY EXAM HIP UNI 2-3 VIEWS: CPT

## 2018-04-16 NOTE — PROGRESS NOTES
Subjective:      Surjit Olivier is a 79 y.o. female here with complaint of right groin pain. Onset was last night. She finds that she unable to lay flat and unable to fully extend the hip. She did work out at [employer] and stretched on Friday. Pain is constant and is described as intense, 8/10 in intensity. Exacerbated with movement, walking, lifting the leg. Alleviating factors: heating pad with some improvement. She also reports that she has lost weight and she is not trying to do so. Current Outpatient Prescriptions   Medication Sig Dispense Refill    ascorbic acid, vitamin C, (VITAMIN C) 500 mg tablet Take 500 mg by mouth daily.  Cholecalciferol, Vitamin D3, (VITAMIN D3) 1,000 unit cap Take  by mouth daily.  vitamin b comp & c no.4 (SUPER B COMPLEX + C) 150 mg tab Take  by mouth daily. Allergies   Allergen Reactions    Pcn [Penicillins] Rash    Clarithromycin Nausea Only    Sulfa (Sulfonamide Antibiotics) Rash       Past Medical History:   Diagnosis Date    Back pain     Chronic pain     lower back and into left hip       Social History   Substance Use Topics    Smoking status: Never Smoker    Smokeless tobacco: Never Used    Alcohol use No        Review of Systems  Pertinent items are noted in HPI. Objective:     Visit Vitals    /80 (BP 1 Location: Right arm, BP Patient Position: Sitting)  Comment: manual    Pulse (!) 59    Temp 97.9 °F (36.6 °C) (Oral)  Comment: .     Resp 18    Ht 5' 6\" (1.676 m)    Wt 119 lb 6.4 oz (54.2 kg)    LMP 09/26/2003    SpO2 99%    BMI 19.27 kg/m2      General appearance - alert, well appearing, and in no distress  Eyes - pupils equal and reactive, extraocular eye movements intact, sclera anicteric  Oropharyngx - mucous membranes moist, pharynx normal without lesions  Neck - supple, no significant adenopathy, thyroid exam: thyroid is normal in size without nodules or tenderness  Chest - clear to auscultation, no wheezes, rales or rhonchi, symmetric air entry, no tachypnea, retractions or cyanosis  Heart - normal rate, regular rhythm, normal S1, S2, no murmurs, rubs, clicks or gallops  Abdomen - soft, nontender, nondistended, no masses or organomegaly  Neurological - alert, oriented, normal speech, no focal findings or movement disorder noted  MSK -    Deformity: None    ROM: reduced secondary to pain      Gait: Antalgic      Palpation:    L1-L5: No tenderness    Sacrum: No tenderness    Coccyx: No tenderness    Left Paraspinal: No tenderness    Right Paraspinal: No tenderness    Greater trochanter: No tenderness    Ischial Tuberosity: No tenderness    Piriformis: No tenderness       Strength (0-5/5)    Hip Flexion:   Left: 5/5  Right: 5/5    Hip Extension:  Left: 5/5  Right: 5/5    Hip Abduction:  Left: 5/5  Right: 5/5    Hip Adduction:  Left: 5/5  Right: 5/5    Knee Extension:  Left: 5/5  Right: 5/5    Knee Flexion:   Left: 5/5  Right: 5/5        Sensation: Intact, no deficits     Assessment/Plan:   Jack Keita is a 79 y.o. female seen for:     1. Right groin pain: in acute discomfort. Muscle strain vs OA. Check hip XR. - XR HIP RT W OR WO PELV 2-3 VWS; Future    2. Weight loss, non-intentional: will check labs as below. - CBC WITH AUTOMATED DIFF  - METABOLIC PANEL, COMPREHENSIVE  - THYROID CASCADE PROFILE  - HEMOGLOBIN A1C WITH EAG    I have discussed the diagnosis with the patient and the intended plan as seen in the above orders. The patient has received an after-visit summary and questions were answered concerning future plans. I have discussed medication side effects and warnings with the patient as well. Patient verbalizes understanding of plan of care and denies further questions or concerns at this time. Informed patient to return to the office if symptoms worsen or if new symptoms arise. Follow-up Disposition:  Return if symptoms worsen or fail to improve.

## 2018-04-16 NOTE — MR AVS SNAPSHOT
[address] [facility] 
 
 
 [facility] [address] [facility] 
[phone] Patient: Gene Price MRN: [id number] SIX:[birth year] Visit Information Date & Time Provider Department Dept. Phone Encounter #  
 4/16/2018 10:30 AM Sil Sanford Delf[phone] [id number] Follow-up Instructions Return if symptoms worsen or fail to improve. Upcoming Health Maintenance Date Due ZOSTER VACCINE AGE 60> 9/9/2010 GLAUCOMA SCREENING Q2Y 11/9/2015 Pneumococcal 65+ Low/Medium Risk (1 of 2 - PCV13) 11/9/2015 Influenza Age 5 to Adult 8/1/2017 COLONOSCOPY 8/30/2018 MEDICARE YEARLY EXAM 8/10/2018 BREAST CANCER SCRN MAMMOGRAM 8/14/2019 DTaP/Tdap/Td series (2 - Td) 8/9/2027 Allergies as of 4/16/2018  Review Complete On: 4/16/2018 By: Sil Sanford MD  
  
 Severity Noted Reaction Type Reactions Pcn [Penicillins] Medium 09/26/2012   Side Effect Rash Clarithromycin  02/10/2012    Nausea Only Sulfa (Sulfonamide Antibiotics)  02/10/2012    Rash Current Immunizations  Reviewed on 8/9/2017 Name Date Influenza Vaccine 10/15/2014 Not reviewed this visit You Were Diagnosed With   
  
 Codes Comments Right groin pain    -  Primary ICD-10-CM: R10.31 ICD-9-CM: 789.09 Weight loss, non-intentional     ICD-10-CM: R63.4 ICD-9-CM: 783.21 Vitals BP Pulse Temp Resp Height(growth percentile) Weight(growth percentile) 117/80 (BP 1 Location: Right arm, BP Patient Position: Sitting) (!) 59 97.9 °F (36.6 °C) (Oral) 18 5' 6\" (1.676 m) 119 lb 6.4 oz (54.2 kg) LMP SpO2 BMI OB Status Smoking Status 09/26/2003 99% 19.27 kg/m2 Postmenopausal Never Smoker Vitals History BMI and BSA Data Body Mass Index Body Surface Area  
 19.27 kg/m 2 1.59 m 2 Preferred Pharmacy Pharmacy Name Phone [address], [address] [place] [address] [phone] Your Updated Medication List  
  
   
This list is accurate as of 4/16/18 11:38 AM.  Always use your most recent med list.  
  
  
  
  
 ascorbic acid (vitamin C) 500 mg tablet Commonly known as:  VITAMIN C Take 500 mg by mouth daily. SUPER B COMPLEX + C 150 mg Tab Generic drug:  vitamin b comp & c no. 4 Take  by mouth daily. VITAMIN D3 1,000 unit Cap Generic drug:  cholecalciferol Take  by mouth daily. We Performed the Following CBC WITH AUTOMATED DIFF [70733 CPT(R)] HEMOGLOBIN A1C WITH EAG [55012 CPT(R)] METABOLIC PANEL, COMPREHENSIVE [89043 CPT(R)] THYROID CASCADE PROFILE [[id number] Custom] Follow-up Instructions Return if symptoms worsen or fail to improve. To-Do List   
 04/16/2018 Imaging:  XR HIP RT W OR WO PELV 2-3 VWS Patient Instructions Groin Strain: Care Instructions Your Care Instructions A groin strain is an injury that happens when you tear or overstretch (pull) a groin muscle. The groin muscles are in the area on either side of the body in the folds where the belly joins the legs. You can strain a groin muscle during exercise, such as running, skating, kicking in soccer, or playing basketball. It can happen when you lift, push, or pull heavy objects. You might pull a groin muscle when you fall. The injury can range from a minor pull to a more serious tear of the muscle. You may feel pain and tenderness that's worse when you squeeze your legs together. You may also have pain when you raise the knee of the injured side. There may be swelling or bruising in the groin area or inner thigh. If you have a bad strain, you may walk with a limp while it heals. Rest and other home care can help the muscle heal. Healing can take up to 3 weeks or more. Your doctor may want to see you again in 2 to 3 weeks. Follow-up care is a key part of your treatment and safety. Be sure to make and go to all appointments, and call your doctor if you are having problems. It's also a good idea to know your test results and keep a list of the medicines you take. How can you care for yourself at home? · Be safe with medicines. Read and follow all instructions on the label. ¨ If the doctor gave you a prescription medicine for pain, take it as prescribed. ¨ If you are not taking a prescription pain medicine, ask your doctor if you can take an over-the-counter medicine. · Rest and protect your injured or sore groin area for 1 to 2 weeks. Stop, change, or take a break from any activity that may be causing your pain or soreness. Do not do intense activities while you still have pain. · Put ice or a cold pack on your groin area for 10 to 20 minutes at a time. Try to do this every 1 to 2 hours for the next 3 days (when you are awake) or until the swelling goes down. Put a thin cloth between the ice and your skin. · After 2 or 3 days, if your swelling is gone, apply heat. Put a warm water bottle, a heating pad set on low, or a warm cloth on your groin area. Do not go to sleep with a heating pad on your skin. · If your doctor gave you crutches, make sure you use them as directed. · Wear snug shorts or underwear that support the injured area. When should you call for help? Call your doctor now or seek immediate medical care if: 
? · You have new or severe pain or swelling in the groin area. ? · Your groin or upper thigh is cool or pale or changes color. ? · You have tingling, weakness, or numbness in your groin or leg. ? · You cannot move your leg. ? · You cannot put weight on your leg. ? Watch closely for changes in your health, and be sure to contact your doctor if: 
? · You do not get better as expected. Where can you learn more? Go to http://dinesh-blaise.info/. Enter P663 in the search box to learn more about \"Groin Strain: Care Instructions. \" Current as of: March 21, 2017 Content Version: 11.4 © 7510-7164 [employer] ([employer]M). Care instructions adapted under license by [employer] (which disclaims liability or warranty for this information). If you have questions about a medical condition or this instruction, always ask your healthcare professional. [address] any warranty or liability for your use of this information. Introducing [facility]! Dear Farzad Parent: Thank you for requesting a [employer] account. Our records indicate that you already have an active [employer] account. You can access your account anytime at [email or web address] Did you know that you can access your hospital and ER discharge instructions at any time in [employer]? You can also review all of your test results from your hospital stay or ER visit. Additional Information If you have questions, please visit the Frequently Asked Questions section of the [employer] website at [email or web address] Remember, [employer] is NOT to be used for urgent needs. For medical emergencies, dial 911. Now available from your iPhone and Android! Please provide this summary of care documentation to your next provider. Your primary care clinician is listed as Mikala Brooks. If you have any questions after today's visit, please call [phone].

## 2018-04-16 NOTE — PROGRESS NOTES
Identified pt with two pt identifiers(name and ). Chief Complaint   Patient presents with    Groin Pain     right side. pain began in the middle of the night last night        Health Maintenance Due   Topic    ZOSTER VACCINE AGE 60>     GLAUCOMA SCREENING Q2Y     Pneumococcal 65+ Low/Medium Risk (1 of 2 - PCV13)    Influenza Age 5 to Adult     COLONOSCOPY        Wt Readings from Last 3 Encounters:   18 119 lb 6.4 oz (54.2 kg)   17 129 lb 9.6 oz (58.8 kg)   17 129 lb (58.5 kg)     Temp Readings from Last 3 Encounters:   18 97.9 °F (36.6 °C) (Oral)   17 97.7 °F (36.5 °C) (Oral)   17 97.6 °F (36.4 °C) (Temporal)     BP Readings from Last 3 Encounters:   18 117/80   17 107/53   17 131/60     Pulse Readings from Last 3 Encounters:   18 (!) 59   17 60   17 68         Learning Assessment:  :     Learning Assessment 2013   PRIMARY LEARNER Patient   HIGHEST LEVEL OF EDUCATION - PRIMARY LEARNER  2 YEARS OF COLLEGE   BARRIERS PRIMARY LEARNER NONE   CO-LEARNER CAREGIVER No   PRIMARY LANGUAGE ENGLISH   LEARNER PREFERENCE PRIMARY DEMONSTRATION     PICTURES   ANSWERED BY self   RELATIONSHIP SELF       Depression Screening:  :     PHQ over the last two weeks 2017   Little interest or pleasure in doing things Not at all   Feeling down, depressed or hopeless Not at all   Total Score PHQ 2 0       Fall Risk Assessment:  :     Fall Risk Assessment, last 12 mths 2017   Able to walk? Yes   Fall in past 12 months? No       Abuse Screening:  :     Abuse Screening Questionnaire 2017   Do you ever feel afraid of your partner? N N   Are you in a relationship with someone who physically or mentally threatens you? N N   Is it safe for you to go home?  Y Y       Coordination of Care Questionnaire:  :     1) Have you been to an emergency room, urgent care clinic since your last visit? no   Hospitalized since your last visit? no 2) Have you seen or consulted any other health care providers outside of [facility] since your last visit? no  (Include any pap smears or colon screenings in this section.)    3) Do you have an Advance Directive on file? no  Are you interested in receiving information about Advance Directives? no    Reviewed record in preparation for visit and have obtained necessary documentation. Medication reconciliation up to date and corrected with patient at this time.

## 2018-04-16 NOTE — PATIENT INSTRUCTIONS
Groin Strain: Care Instructions  Your Care Instructions  A groin strain is an injury that happens when you tear or overstretch (pull) a groin muscle. The groin muscles are in the area on either side of the body in the folds where the belly joins the legs. You can strain a groin muscle during exercise, such as running, skating, kicking in soccer, or playing basketball. It can happen when you lift, push, or pull heavy objects. You might pull a groin muscle when you fall. The injury can range from a minor pull to a more serious tear of the muscle. You may feel pain and tenderness that's worse when you squeeze your legs together. You may also have pain when you raise the knee of the injured side. There may be swelling or bruising in the groin area or inner thigh. If you have a bad strain, you may walk with a limp while it heals. Rest and other home care can help the muscle heal. Healing can take up to 3 weeks or more. Your doctor may want to see you again in 2 to 3 weeks. Follow-up care is a key part of your treatment and safety. Be sure to make and go to all appointments, and call your doctor if you are having problems. It's also a good idea to know your test results and keep a list of the medicines you take. How can you care for yourself at home? · Be safe with medicines. Read and follow all instructions on the label. ¨ If the doctor gave you a prescription medicine for pain, take it as prescribed. ¨ If you are not taking a prescription pain medicine, ask your doctor if you can take an over-the-counter medicine. · Rest and protect your injured or sore groin area for 1 to 2 weeks. Stop, change, or take a break from any activity that may be causing your pain or soreness. Do not do intense activities while you still have pain. · Put ice or a cold pack on your groin area for 10 to 20 minutes at a time. Try to do this every 1 to 2 hours for the next 3 days (when you are awake) or until the swelling goes down. Put a thin cloth between the ice and your skin. · After 2 or 3 days, if your swelling is gone, apply heat. Put a warm water bottle, a heating pad set on low, or a warm cloth on your groin area. Do not go to sleep with a heating pad on your skin. · If your doctor gave you crutches, make sure you use them as directed. · Wear snug shorts or underwear that support the injured area. When should you call for help? Call your doctor now or seek immediate medical care if:  ? · You have new or severe pain or swelling in the groin area. ? · Your groin or upper thigh is cool or pale or changes color. ? · You have tingling, weakness, or numbness in your groin or leg. ? · You cannot move your leg. ? · You cannot put weight on your leg. ? Watch closely for changes in your health, and be sure to contact your doctor if:  ? · You do not get better as expected. Where can you learn more? Go to http://dinesh-blaise.info/. Enter N584 in the search box to learn more about \"Groin Strain: Care Instructions. \"  Current as of: March 21, 2017  Content Version: 11.4  © 3306-6223 [employer]. Care instructions adapted under license by [employer] (which disclaims liability or warranty for this information). If you have questions about a medical condition or this instruction, always ask your healthcare professional. [place] [address] any warranty or liability for your use of this information.

## 2018-04-16 NOTE — LETTER
NOTIFICATION RETURN TO WORK / SCHOOL 
 
4/16/2018 11:53 AM 
 
Ms. Lucas Carreon [address] [facility] [address] 
[phone] [phone] To Whom It May Concern: 
 
Lucas Carreon is currently under the care of [address] [place] [address]. She will return to work/school on: April 18, 2018 If there are questions or concerns please have the patient contact our office. Sincerely, William Rose MD

## 2018-04-17 LAB
ALBUMIN SERPL-MCNC: 4.9 G/DL (ref 3.6–4.8)
ALBUMIN/GLOB SERPL: 1.9 {RATIO} (ref 1.2–2.2)
ALP SERPL-CCNC: 80 IU/L (ref 39–117)
ALT SERPL-CCNC: 29 IU/L (ref 0–32)
AST SERPL-CCNC: 35 IU/L (ref 0–40)
BASOPHILS # BLD AUTO: 0 X10E3/UL (ref 0–0.2)
BASOPHILS NFR BLD AUTO: 0 %
BILIRUB SERPL-MCNC: 0.4 MG/DL (ref 0–1.2)
BUN SERPL-MCNC: 19 MG/DL (ref 8–27)
BUN/CREAT SERPL: 26 (ref 12–28)
CALCIUM SERPL-MCNC: 10 MG/DL (ref 8.7–10.3)
CHLORIDE SERPL-SCNC: 99 MMOL/L (ref 96–106)
CO2 SERPL-SCNC: 24 MMOL/L (ref 18–29)
CREAT SERPL-MCNC: 0.73 MG/DL (ref 0.57–1)
EOSINOPHIL # BLD AUTO: 0.1 X10E3/UL (ref 0–0.4)
EOSINOPHIL NFR BLD AUTO: 1 %
ERYTHROCYTE [DISTWIDTH] IN BLOOD BY AUTOMATED COUNT: 14.1 % (ref 12.3–15.4)
EST. AVERAGE GLUCOSE BLD GHB EST-MCNC: 114 MG/DL
GFR SERPLBLD CREATININE-BSD FMLA CKD-EPI: 85 ML/MIN/1.73
GFR SERPLBLD CREATININE-BSD FMLA CKD-EPI: 99 ML/MIN/1.73
GLOBULIN SER CALC-MCNC: 2.6 G/DL (ref 1.5–4.5)
GLUCOSE SERPL-MCNC: 95 MG/DL (ref 65–99)
HBA1C MFR BLD: 5.6 % (ref 4.8–5.6)
HCT VFR BLD AUTO: 38.7 % (ref 34–46.6)
HGB BLD-MCNC: 12.7 G/DL (ref 11.1–15.9)
IMM GRANULOCYTES # BLD: 0 X10E3/UL (ref 0–0.1)
IMM GRANULOCYTES NFR BLD: 0 %
LYMPHOCYTES # BLD AUTO: 1.8 X10E3/UL (ref 0.7–3.1)
LYMPHOCYTES NFR BLD AUTO: 26 %
MCH RBC QN AUTO: 29.7 PG (ref 26.6–33)
MCHC RBC AUTO-ENTMCNC: 32.8 G/DL (ref 31.5–35.7)
MCV RBC AUTO: 90 FL (ref 79–97)
MONOCYTES # BLD AUTO: 0.5 X10E3/UL (ref 0.1–0.9)
MONOCYTES NFR BLD AUTO: 7 %
NEUTROPHILS # BLD AUTO: 4.7 X10E3/UL (ref 1.4–7)
NEUTROPHILS NFR BLD AUTO: 66 %
PLATELET # BLD AUTO: 263 X10E3/UL (ref 150–379)
POTASSIUM SERPL-SCNC: 4.9 MMOL/L (ref 3.5–5.2)
PROT SERPL-MCNC: 7.5 G/DL (ref 6–8.5)
RBC # BLD AUTO: 4.28 X10E6/UL (ref 3.77–5.28)
SODIUM SERPL-SCNC: 142 MMOL/L (ref 134–144)
TSH SERPL DL<=0.005 MIU/L-ACNC: 2.25 UIU/ML (ref 0.45–4.5)
WBC # BLD AUTO: 7.2 X10E3/UL (ref 3.4–10.8)

## 2018-04-23 ENCOUNTER — TELEPHONE (OUTPATIENT)
Dept: FAMILY MEDICINE CLINIC | Age: 68
End: 2018-04-23

## 2018-04-23 NOTE — TELEPHONE ENCOUNTER
----- Message from Vitaly Paula sent at 4/23/2018  3:09 PM EDT -----  Regarding: Dr. Balaji Aceves calling for results from blood work. The best contact is [phone]. Pt would like to have a hard copy of lab results to current address  on file. Pt concerned about sugar results.

## 2018-04-25 NOTE — TELEPHONE ENCOUNTER
Spoke with  who confirmed all labs were normal and relayed to the pt. She verbally understood and we will mail out a hard copy.

## 2018-05-30 ENCOUNTER — TELEPHONE (OUTPATIENT)
Dept: FAMILY MEDICINE CLINIC | Age: 68
End: 2018-05-30

## 2018-05-30 NOTE — TELEPHONE ENCOUNTER
Patient called requesting a call back from [place]. Patient did not wish to explain the reason of her call.     Best contact:   [phone]

## 2018-05-30 NOTE — LETTER
5/31/2018 12:13 PM 
 
Ms. Simin Strickland [address] 
[phone] [facility] [phone] Dear Delia Mike, Please see lab results and Hip XR. Results for orders placed or performed in visit on 04/16/18 CBC WITH AUTOMATED DIFF Result Value Ref Range WBC 7.2 3.4 - 10.8 x10E3/uL  
 RBC 4.28 3.77 - 5.28 x10E6/uL HGB 12.7 11.1 - 15.9 g/dL HCT 38.7 34.0 - 46.6 % MCV 90 79 - 97 fL  
 MCH 29.7 26.6 - 33.0 pg  
 MCHC 32.8 31.5 - 35.7 g/dL  
 RDW 14.1 12.3 - 15.4 % PLATELET 223 234 - 926 x10E3/uL NEUTROPHILS 66 Not Estab. % Lymphocytes 26 Not Estab. % MONOCYTES 7 Not Estab. % EOSINOPHILS 1 Not Estab. % BASOPHILS 0 Not Estab. %  
 ABS. NEUTROPHILS 4.7 1.4 - 7.0 x10E3/uL Abs Lymphocytes 1.8 0.7 - 3.1 x10E3/uL  
 ABS. MONOCYTES 0.5 0.1 - 0.9 x10E3/uL  
 ABS. EOSINOPHILS 0.1 0.0 - 0.4 x10E3/uL  
 ABS. BASOPHILS 0.0 0.0 - 0.2 x10E3/uL IMMATURE GRANULOCYTES 0 Not Estab. %  
 ABS. IMM. GRANS. 0.0 0.0 - 0.1 x10E3/uL METABOLIC PANEL, COMPREHENSIVE Result Value Ref Range Glucose 95 65 - 99 mg/dL BUN 19 8 - 27 mg/dL Creatinine 0.73 0.57 - 1.00 mg/dL GFR est non-AA 85 >59 mL/min/1.73 GFR est AA 99 >59 mL/min/1.73  
 BUN/Creatinine ratio 26 12 - 28 Sodium 142 134 - 144 mmol/L Potassium 4.9 3.5 - 5.2 mmol/L Chloride 99 96 - 106 mmol/L  
 CO2 24 18 - 29 mmol/L Calcium 10.0 8.7 - 10.3 mg/dL Protein, total 7.5 6.0 - 8.5 g/dL Albumin 4.9 (H) 3.6 - 4.8 g/dL GLOBULIN, TOTAL 2.6 1.5 - 4.5 g/dL A-G Ratio 1.9 1.2 - 2.2 Bilirubin, total 0.4 0.0 - 1.2 mg/dL Alk. phosphatase 80 39 - 117 IU/L  
 AST (SGOT) 35 0 - 40 IU/L  
 ALT (SGPT) 29 0 - 32 IU/L THYROID CASCADE PROFILE Result Value Ref Range TSH 2.250 0.450 - 4.500 uIU/mL HEMOGLOBIN A1C WITH EAG Result Value Ref Range Hemoglobin A1c 5.6 4.8 - 5.6 % Estimated average glucose 114 mg/dL Sincerely, Ana Cristina Neri LPN

## 2018-05-31 NOTE — TELEPHONE ENCOUNTER
She was upset she has not rec'd letter with her results from April. I told her I would send them to her.

## 2018-06-08 ENCOUNTER — TELEPHONE (OUTPATIENT)
Dept: FAMILY MEDICINE CLINIC | Age: 68
End: 2018-06-08

## 2018-06-08 NOTE — TELEPHONE ENCOUNTER
Message forwarded from call center    Patient requesting call back from nurse.  You were going to mail something to her, but she never got it. Ashwin Jacob number is [phone].

## 2018-08-20 ENCOUNTER — ANESTHESIA (OUTPATIENT)
Dept: ENDOSCOPY | Age: 68
End: 2018-08-20
Payer: COMMERCIAL

## 2018-08-20 ENCOUNTER — HOSPITAL ENCOUNTER (OUTPATIENT)
Age: 68
Setting detail: OUTPATIENT SURGERY
Discharge: HOME | End: 2018-08-20
Attending: SPECIALIST | Admitting: SPECIALIST
Payer: COMMERCIAL

## 2018-08-20 ENCOUNTER — ANESTHESIA EVENT (OUTPATIENT)
Dept: ENDOSCOPY | Age: 68
End: 2018-08-20
Payer: COMMERCIAL

## 2018-08-20 VITALS
SYSTOLIC BLOOD PRESSURE: 112 MMHG | BODY MASS INDEX: 19.21 KG/M2 | RESPIRATION RATE: 14 BRPM | DIASTOLIC BLOOD PRESSURE: 79 MMHG | OXYGEN SATURATION: 100 % | WEIGHT: 119 LBS | HEART RATE: 56 BPM | TEMPERATURE: 97.4 F

## 2018-08-20 PROCEDURE — 76040000007: Performed by: SPECIALIST

## 2018-08-20 PROCEDURE — 77030027957 HC TBNG IRR ENDOGTR BUSS -B: Performed by: SPECIALIST

## 2018-08-20 PROCEDURE — 77030013992 HC SNR POLYP ENDOSC BSC -B: Performed by: SPECIALIST

## 2018-08-20 PROCEDURE — 88305 TISSUE EXAM BY PATHOLOGIST: CPT | Performed by: SPECIALIST

## 2018-08-20 PROCEDURE — 74011250637 HC RX REV CODE- 250/637: Performed by: SPECIALIST

## 2018-08-20 PROCEDURE — 74011250636 HC RX REV CODE- 250/636: Performed by: SPECIALIST

## 2018-08-20 PROCEDURE — 77030009426 HC FCPS BIOP ENDOSC BSC -B: Performed by: SPECIALIST

## 2018-08-20 PROCEDURE — 74011250636 HC RX REV CODE- 250/636

## 2018-08-20 PROCEDURE — 76060000032 HC ANESTHESIA 0.5 TO 1 HR: Performed by: SPECIALIST

## 2018-08-20 RX ORDER — SODIUM CHLORIDE 9 MG/ML
50 INJECTION, SOLUTION INTRAVENOUS CONTINUOUS
Status: DISCONTINUED | OUTPATIENT
Start: 2018-08-20 | End: 2018-08-20 | Stop reason: HOSPADM

## 2018-08-20 RX ORDER — FENTANYL CITRATE 50 UG/ML
200 INJECTION, SOLUTION INTRAMUSCULAR; INTRAVENOUS
Status: DISCONTINUED | OUTPATIENT
Start: 2018-08-20 | End: 2018-08-20 | Stop reason: HOSPADM

## 2018-08-20 RX ORDER — LORAZEPAM 2 MG/ML
2 INJECTION INTRAMUSCULAR AS NEEDED
Status: DISCONTINUED | OUTPATIENT
Start: 2018-08-20 | End: 2018-08-20 | Stop reason: HOSPADM

## 2018-08-20 RX ORDER — EPINEPHRINE 0.1 MG/ML
1 INJECTION INTRACARDIAC; INTRAVENOUS
Status: DISCONTINUED | OUTPATIENT
Start: 2018-08-20 | End: 2018-08-20 | Stop reason: HOSPADM

## 2018-08-20 RX ORDER — MIDAZOLAM HYDROCHLORIDE 1 MG/ML
.25-1 INJECTION, SOLUTION INTRAMUSCULAR; INTRAVENOUS
Status: DISCONTINUED | OUTPATIENT
Start: 2018-08-20 | End: 2018-08-20 | Stop reason: HOSPADM

## 2018-08-20 RX ORDER — DEXTROMETHORPHAN/PSEUDOEPHED 2.5-7.5/.8
1.2 DROPS ORAL
Status: DISCONTINUED | OUTPATIENT
Start: 2018-08-20 | End: 2018-08-20 | Stop reason: HOSPADM

## 2018-08-20 RX ORDER — FLUMAZENIL 0.1 MG/ML
0.2 INJECTION INTRAVENOUS
Status: DISCONTINUED | OUTPATIENT
Start: 2018-08-20 | End: 2018-08-20 | Stop reason: HOSPADM

## 2018-08-20 RX ORDER — PROPOFOL 10 MG/ML
INJECTION, EMULSION INTRAVENOUS AS NEEDED
Status: DISCONTINUED | OUTPATIENT
Start: 2018-08-20 | End: 2018-08-20 | Stop reason: HOSPADM

## 2018-08-20 RX ORDER — LIDOCAINE HYDROCHLORIDE 20 MG/ML
INJECTION, SOLUTION EPIDURAL; INFILTRATION; INTRACAUDAL; PERINEURAL AS NEEDED
Status: DISCONTINUED | OUTPATIENT
Start: 2018-08-20 | End: 2018-08-20 | Stop reason: HOSPADM

## 2018-08-20 RX ORDER — NALOXONE HYDROCHLORIDE 0.4 MG/ML
0.4 INJECTION, SOLUTION INTRAMUSCULAR; INTRAVENOUS; SUBCUTANEOUS
Status: DISCONTINUED | OUTPATIENT
Start: 2018-08-20 | End: 2018-08-20 | Stop reason: HOSPADM

## 2018-08-20 RX ORDER — SODIUM CHLORIDE 0.9 % (FLUSH) 0.9 %
5-10 SYRINGE (ML) INJECTION AS NEEDED
Status: DISCONTINUED | OUTPATIENT
Start: 2018-08-20 | End: 2018-08-20 | Stop reason: HOSPADM

## 2018-08-20 RX ORDER — ATROPINE SULFATE 0.1 MG/ML
0.5 INJECTION INTRAVENOUS
Status: DISCONTINUED | OUTPATIENT
Start: 2018-08-20 | End: 2018-08-20 | Stop reason: HOSPADM

## 2018-08-20 RX ORDER — SODIUM CHLORIDE 9 MG/ML
INJECTION, SOLUTION INTRAVENOUS
Status: DISCONTINUED | OUTPATIENT
Start: 2018-08-20 | End: 2018-08-20 | Stop reason: HOSPADM

## 2018-08-20 RX ORDER — SODIUM CHLORIDE 0.9 % (FLUSH) 0.9 %
5-10 SYRINGE (ML) INJECTION EVERY 8 HOURS
Status: DISCONTINUED | OUTPATIENT
Start: 2018-08-20 | End: 2018-08-20 | Stop reason: HOSPADM

## 2018-08-20 RX ADMIN — LIDOCAINE HYDROCHLORIDE 40 MG: 20 INJECTION, SOLUTION EPIDURAL; INFILTRATION; INTRACAUDAL; PERINEURAL at 11:36

## 2018-08-20 RX ADMIN — PROPOFOL 50 MG: 10 INJECTION, EMULSION INTRAVENOUS at 11:38

## 2018-08-20 RX ADMIN — PROPOFOL 50 MG: 10 INJECTION, EMULSION INTRAVENOUS at 11:45

## 2018-08-20 RX ADMIN — PROPOFOL 50 MG: 10 INJECTION, EMULSION INTRAVENOUS at 11:51

## 2018-08-20 RX ADMIN — SODIUM CHLORIDE: 9 INJECTION, SOLUTION INTRAVENOUS at 11:30

## 2018-08-20 RX ADMIN — PROPOFOL 50 MG: 10 INJECTION, EMULSION INTRAVENOUS at 11:41

## 2018-08-20 RX ADMIN — PROPOFOL 50 MG: 10 INJECTION, EMULSION INTRAVENOUS at 11:36

## 2018-08-20 RX ADMIN — PROPOFOL 50 MG: 10 INJECTION, EMULSION INTRAVENOUS at 11:57

## 2018-08-20 NOTE — ANESTHESIA PREPROCEDURE EVALUATION
Anesthetic History   No history of anesthetic complications            Review of Systems / Medical History  Patient summary reviewed, nursing notes reviewed and pertinent labs reviewed    Pulmonary  Within defined limits                 Neuro/Psych   Within defined limits           Cardiovascular                  Exercise tolerance: >4 METS     GI/Hepatic/Renal               Comments: screen Endo/Other        Arthritis     Other Findings            Physical Exam    Airway  Mallampati: I  TM Distance: > 6 cm  Neck ROM: normal range of motion   Mouth opening: Normal     Cardiovascular    Rhythm: regular  Rate: normal         Dental  No notable dental hx       Pulmonary  Breath sounds clear to auscultation               Abdominal         Other Findings            Anesthetic Plan    ASA: 1  Anesthesia type: MAC          Induction: Intravenous  Anesthetic plan and risks discussed with: Patient

## 2018-08-20 NOTE — PROCEDURES
Colonoscopy Procedure Note    Indications:   Personal history of colon polyps (screening only), weight loss w/o GI symptoms  Referring Physician: Yahaira March MD   Anesthesia/Sedation:MAC  Endoscopist:  Dr. Yuni Sharma  Assistant:  Endoscopy Technician-1: Edda Mendoza  Endoscopy RN-1: Anna Rodríguez RN    Preoperative diagnosis: History of colon polyps    Postoperative diagnosis: Questioniable extrinsic anterior rectal mass, probably small fibroid. transverse colon polyps      Procedure in Detail:  Informed consent was obtained for the procedure, including sedation. Risks of perforation, hemorrhage, adverse drug reaction, and aspiration were discussed. The patient was placed in the left lateral decubitus position. Based on the pre-procedure assessment, including review of the patient's medical history, medications, allergies, and review of systems, she had been deemed to be an appropriate candidate for moderate sedation; she was therefore sedated with the medications listed above. The patient was monitored continuously with ECG tracing, pulse oximetry, blood pressure monitoring, and direct observations. A rectal examination was performed. The [id number] was inserted into the rectum and advanced under direct vision to the cecum, which was identified by the ileocecal valve and appendiceal orifice. The quality of the colonic preparation was excellent. A careful inspection was made as the colonoscope was withdrawn, including a retroflexed view of the rectum; findings and interventions are described below.       Findings:   Rectum: normal, extrinsic anterior 3 cm hard mass on digital suspect fibroid  Sigmoid: normal  Descending Colon: normal  Transverse Colon: 3 mm and 2 mm polyps of proximal transverse colon removed with cold forceps   Ascending Colon: normal  Cecum: normal  Terminal Ileum: not intubated    Specimens:     see above    EBL: None    Complications: None; patient tolerated the procedure well. Recommendations:     - Await pathology. - Repeat colonoscopy in 5 years.      - If < 10 years, reason: above average risk patient    - Small pelvic mass felt on exam probably just fibroid please make appointment to see GYN-MD    - Liberalize diet eat protein and calorie supplements, eat multiple meals a day    - If GYN evaluation is normal call office to schedule a CT of the abdomen and pelvis    - Make follow up appointment for 2 months  Signed By: Gus Luna MD                        August 20, 2018

## 2018-08-20 NOTE — ANESTHESIA POSTPROCEDURE EVALUATION
Post-Anesthesia Evaluation and Assessment    Patient: Leticia Barbosa MRN: [id number]  SSN: [id number]    YOB: 1950  Age: 79 y.o. Sex: female       Cardiovascular Function/Vital Signs  Visit Vitals    /79    Pulse (!) 56    Temp 36.3 °C (97.4 °F)    Resp 14    Wt 54 kg (119 lb)    SpO2 100%    BMI 19.21 kg/m2       Patient is status post MAC anesthesia for Procedure(s):  COLONOSCOPY  ENDOSCOPIC POLYPECTOMY. Nausea/Vomiting: None    Postoperative hydration reviewed and adequate. Pain:  Pain Scale 1: Numeric (0 - 10) (08/20/18 1235)  Pain Intensity 1: 0 (08/20/18 1235)   Managed    Neurological Status: At baseline    Mental Status and Level of Consciousness: Arousable    Pulmonary Status:   O2 Device: Room air (08/20/18 1235)   Adequate oxygenation and airway patent    Complications related to anesthesia: None    Post-anesthesia assessment completed.  No concerns    Signed By: Robert Narvaez MD     August 20, 2018

## 2018-08-20 NOTE — ROUTINE PROCESS
Celine Wesley  1950  [id number]    Situation:  Verbal report received from: Danae  Procedure: Procedure(s):  COLONOSCOPY  ENDOSCOPIC POLYPECTOMY    Background:    Preoperative diagnosis: History of colon polyps  Postoperative diagnosis: Questioniable extrinsic anterior rectal mass, probably small fibroid.  transverse colon polyps    :  Dr. Keren More  Assistant(s): Endoscopy Technician-1: Elisabeth Castro  Endoscopy RN-1: Mariel Hall RN    Specimens:   ID Type Source Tests Collected by Time Destination   1 : polyps, transverse colon, 2 Preservative   Pompey Libman, MD 8/20/2018 1153 Pathology     H. Pylori  no    Assessment:  Intra-procedure medications     Anesthesia gave intra-procedure sedation and medications, see anesthesia flow sheet yes    Intravenous fluids: NS@ KVO     Vital signs stable     Abdominal assessment: round and soft     Recommendation:  Discharge patient per MD orde  Family or Friend  Permission to share finding with family or friend yes

## 2018-08-20 NOTE — PERIOP NOTES
Patient has been evaluated by anesthesia pre-procedure. Patient alert and oriented. Vital signs will not be charted by the Endoscopy nurse. All vitals, airway, and loc are monitored by anesthesia staff throughout procedure.      .Endoscope was pre-cleaned at bedside immediately following procedure by Curtis Mishra

## 2018-08-20 NOTE — IP AVS SNAPSHOT
[address] 
[phone] Patient: Elizabeth Burrows MRN: [id number] GTC:85/3/0394 About your hospitalization You were admitted on:  August 20, 2018 You last received care in the:  [facility] ENDOSCOPY You were discharged on:  August 20, 2018 Why you were hospitalized Your primary diagnosis was:  Not on File Follow-up Information None Discharge Orders None A check rebecca indicates which time of day the medication should be taken. My Medications CONTINUE taking these medications Instructions Each Dose to Equal  
 Morning Noon Evening Bedtime  
 ascorbic acid (vitamin C) 500 mg tablet Commonly known as:  VITAMIN C Your last dose was: Your next dose is: Take 500 mg by mouth daily. 500 mg  
    
   
   
   
  
 SUPER B COMPLEX + C 150 mg Tab Generic drug:  vitamin b comp & c no.4 Your last dose was: Your next dose is: Take  by mouth daily. VITAMIN D3 1,000 unit Cap Generic drug:  cholecalciferol Your last dose was: Your next dose is: Take  by mouth daily. Discharge Instructions Elizabeth Burrows [id number] 
1950 COLON DISCHARGE INSTRUCTIONS Discomfort: 
Redness at IV site- apply warm compress to area; if redness or soreness persist- contact your physician There may be a slight amount of blood passed from the rectum Gaseous discomfort- walking, belching will help relieve any discomfort You may not operate a vehicle for 12 hours You may not engage in an occupation involving machinery or appliances for rest of today You may not drink alcoholic beverages for at least 12 hours Avoid making any critical decisions for at least 24 hour DIET: 
 Regular diet.  however -  remember your colon is empty and a heavy meal will produce gas. Avoid these foods:  vegetables, fried / greasy foods, carbonated drinks for today. ACTIVITY: 
You may resume your normal daily activities it is recommended that you spend the remainder of the day resting -  avoid any strenuous activity. CALL M.D. ANY SIGN OF: Increasing pain, nausea, vomiting Abdominal distension (swelling) New increased bleeding (oral or rectal) Fever (chills) Pain in chest area Bloody discharge from nose or mouth Shortness of breath You may  take any Advil, Aspirin, Ibuprofen, Motrin, Aleve, Goodys, or any similar pain or arthritis products or  Tylenol as needed for pain. Follow-up Instructions: 
 Call Dr. Ozzie Hammans Results of procedure / biopsy in 10-14days Office telephone for problems or questions [phone] - Await pathology. - Repeat colonoscopy in 5 years. - If < 10 years, reason: above average risk patient 
  - Small pelvic mass felt on exam probably just fibroid please make appointment to see GYN-MD 
  - Liberalize diet eat protein and calorie supplements, eat multiple meals a day - If GYN evaluation is normal call office to schedule a CT of the abdomen and pelvis - Make follow up appointment for 2 months RebelMouse Activation Thank you for requesting access to RebelMouse. Please follow the instructions below to securely access and download your online medical record. RebelMouse allows you to send messages to your doctor, view your test results, renew your prescriptions, schedule appointments, and more. How Do I Sign Up? 1. In your internet browser, go to [email or web address] 
2. Click on the First Time User? Click Here link in the Sign In box. You will be redirect to the New Member Sign Up page. 3. Enter your RebelMouse Access Code exactly as it appears below. You will not need to use this code after youve completed the sign-up process. If you do not sign up before the expiration date, you must request a new code. TestFreaks Access Code: Activation code not generated Current TestFreaks Status: Active (This is the date your TestFreaks access code will ) 4. Enter the last four digits of your Social Security Number (xxxx) and Date of Birth (mm/dd/yyyy) as indicated and click Submit. You will be taken to the next sign-up page. 5. Create a Lijit Networkst ID. This will be your TestFreaks login ID and cannot be changed, so think of one that is secure and easy to remember. 6. Create a TestFreaks password. You can change your password at any time. 7. Enter your Password Reset Question and Answer. This can be used at a later time if you forget your password. 8. Enter your e-mail address. You will receive e-mail notification when new information is available in [address] 9. Click Sign Up. You can now view and download portions of your medical record. 10. Click the Download Summary menu link to download a portable copy of your medical information. Additional Information If you have questions, please visit the Frequently Asked Questions section of the TestFreaks website at [email or web address] Remember, TestFreaks is NOT to be used for urgent needs. For medical emergencies, dial 911. Introducing [facility] SERVICES! Dear Brent Jeanr: Thank you for requesting a TestFreaks account. Our records indicate that you already have an active TestFreaks account. You can access your account anytime at [email or web address] Did you know that you can access your hospital and ER discharge instructions at any time in TestFreaks? You can also review all of your test results from your hospital stay or ER visit. Additional Information If you have questions, please visit the Frequently Asked Questions section of the TestFreaks website at [email or web address] Remember, Lijit Networkst is NOT to be used for urgent needs. For medical emergencies, dial 911. Now available from your iPhone and Android! Introducing Tom Thomas As a Silvia Card patient, I wanted to make you aware of our electronic visit tool called Tom Thomas. [facility] 24/7 allows you to connect within minutes with a medical provider 24 hours a day, seven days a week via a mobile device or tablet or logging into a secure website from your computer. You can access Tom Thomas from anywhere in the [place]. A virtual visit might be right for you when you have a simple condition and feel like you just dont want to get out of bed, or cant get away from work for an appointment, when your regular Silvia Card provider is not available (evenings, weekends or holidays), or when youre out of town and need minor care. Electronic visits cost only $49 and if the CareXtend Card 24/7 provider determines a prescription is needed to treat your condition, one can be electronically transmitted to a nearby pharmacy*. Please take a moment to enroll today if you have not already done so. The enrollment process is free and takes just a few minutes. To enroll, please download the Silvia Card 24/7 aby to your tablet or phone, or visit [email or web address] to enroll on your computer. And, as an [address] [place] [address] patient with a Rapp IT Up account, the results of your visits will be scanned into your electronic medical record and your primary care provider will be able to view the scanned results. We urge you to continue to see your regular Silvia Card provider for your ongoing medical care. And while your primary care provider may not be the one available when you seek a Tom Thomas virtual visit, the peace of mind you get from getting a real diagnosis real time can be priceless. For more information on Tom Thomas, view our Frequently Asked Questions (FAQs) at [email or web address]. Sincerely, 
 
Milvia Fisher MD 
Chief Medical Officer Sherita8 Mari Palacios *:  certain medications cannot be prescribed via Tom Thomas Providers Seen During Your Hospitalization Provider Specialty Primary office phone Helena Melgoza MD Gastroenterology [phone] Your Primary Care Physician (PCP) Primary Care Physician Office Phone Office Fax Kody Ratliff [phone] You are allergic to the following Allergen Reactions Pcn (Penicillins) Rash Clarithromycin Nausea Only Sulfa (Sulfonamide Antibiotics) Rash Recent Documentation Weight BMI OB Status Smoking Status 54 kg 19.21 kg/m2 Postmenopausal Never Smoker Emergency Contacts Name Discharge Info Relation Home Work Mobile [facility] DISCHARGE CAREGIVER [3] Child [2] [phone] Patient Belongings The following personal items are in your possession at time of discharge: 
  Dental Appliances: None Please provide this summary of care documentation to your next provider. Signatures-by signing, you are acknowledging that this After Visit Summary has been reviewed with you and you have received a copy. Patient Signature:  ____________________________________________________________ Date:  ____________________________________________________________  
  
[facility] Provider Signature:  ____________________________________________________________ Date:  ____________________________________________________________

## 2018-08-20 NOTE — DISCHARGE INSTRUCTIONS
Geneva Vuong  [id number]  1950    COLON DISCHARGE INSTRUCTIONS  Discomfort:  Redness at IV site- apply warm compress to area; if redness or soreness persist- contact your physician  There may be a slight amount of blood passed from the rectum  Gaseous discomfort- walking, belching will help relieve any discomfort  You may not operate a vehicle for 12 hours  You may not engage in an occupation involving machinery or appliances for rest of today  You may not drink alcoholic beverages for at least 12 hours  Avoid making any critical decisions for at least 24 hour  DIET:   Regular diet. - however -  remember your colon is empty and a heavy meal will produce gas. Avoid these foods:  vegetables, fried / greasy foods, carbonated drinks for today. ACTIVITY:  You may resume your normal daily activities it is recommended that you spend the remainder of the day resting -  avoid any strenuous activity. CALL M.D. ANY SIGN OF:  Increasing pain, nausea, vomiting  Abdominal distension (swelling)  New increased bleeding (oral or rectal)  Fever (chills)  Pain in chest area  Bloody discharge from nose or mouth  Shortness of breath    You may  take any Advil, Aspirin, Ibuprofen, Motrin, Aleve, Goodys, or any similar pain or arthritis products or  Tylenol as needed for pain. Follow-up Instructions:   Call Dr. Phylicia Pena  Results of procedure / biopsy in 10-14days   Office telephone for problems or questions [phone]      - Await pathology. - Repeat colonoscopy in 5 years. - If < 10 years, reason: above average risk patient    - Small pelvic mass felt on exam probably just fibroid please make appointment to see GYN-MD    - Liberalize diet eat protein and calorie supplements, eat multiple meals a day    - If GYN evaluation is normal call office to schedule a CT of the abdomen and pelvis    - Make follow up appointment for 2 months  Filtec Activation    Thank you for requesting access to Filtec.  Please follow the instructions below to securely access and download your online medical record. Breather allows you to send messages to your doctor, view your test results, renew your prescriptions, schedule appointments, and more. How Do I Sign Up? 1. In your internet browser, go to [email or web address]  2. Click on the First Time User? Click Here link in the Sign In box. You will be redirect to the New Member Sign Up page. 3. Enter your Breather Access Code exactly as it appears below. You will not need to use this code after youve completed the sign-up process. If you do not sign up before the expiration date, you must request a new code. Breather Access Code: Activation code not generated  Current Breather Status: Active (This is the date your Breather access code will )    4. Enter the last four digits of your Social Security Number (xxxx) and Date of Birth (mm/dd/yyyy) as indicated and click Submit. You will be taken to the next sign-up page. 5. Create a Breather ID. This will be your Breather login ID and cannot be changed, so think of one that is secure and easy to remember. 6. Create a Breather password. You can change your password at any time. 7. Enter your Password Reset Question and Answer. This can be used at a later time if you forget your password. 8. Enter your e-mail address. You will receive e-mail notification when new information is available in [address] 9. Click Sign Up. You can now view and download portions of your medical record. 10. Click the Download Summary menu link to download a portable copy of your medical information. Additional Information    If you have questions, please visit the Frequently Asked Questions section of the Breather website at [email or web address] At [email or web address]. [email or web address] Home [email or web address] Remember, Breather is NOT to be used for urgent needs. For medical emergencies, dial 911.

## 2018-08-20 NOTE — H&P
Pre-endoscopy H and P for Colonoscopy    The patient was seen and examined. Date of last colonoscopy: 2013, Polyps  Yes      The airway was assessed and documented. The problem list, past medical history, and medications were reviewed. Patient Active Problem List   Diagnosis Code    DDD (degenerative disc disease), lumbar M51.36    Hypercholesterolemia E78.00     Social History     Social History    Marital status:      Spouse name: N/A    Number of children: N/A    Years of education: N/A     Occupational History    Not on file. Social History Main Topics    Smoking status: Never Smoker    Smokeless tobacco: Never Used    Alcohol use No    Drug use: No    Sexual activity: Yes     Partners: Male     Birth control/ protection: Surgical     Other Topics Concern    Not on file     Social History Narrative     Past Medical History:   Diagnosis Date    Back pain     Chronic pain     lower back and into left hip     The patient has a family history of na    Prior to Admission Medications   Prescriptions Last Dose Informant Patient Reported? Taking? Cholecalciferol, Vitamin D3, (VITAMIN D3) 1,000 unit cap 8/13/2018  Yes No   Sig: Take  by mouth daily. ascorbic acid, vitamin C, (VITAMIN C) 500 mg tablet 8/13/2018 at Unknown time  Yes Yes   Sig: Take 500 mg by mouth daily. vitamin b comp & c no.4 (SUPER B COMPLEX + C) 150 mg tab 8/13/2018  Yes No   Sig: Take  by mouth daily. Facility-Administered Medications: None         The review of systems is:  negative for shortness of breath or chest pain      The heart, lungs and mental status were satisfactory for the administration of MAC sedation and for the procedure. Mallampati score: See Anesthesia. I discussed with the patient the objectives, risks, consequences and alternatives to the procedure. Plan: Endoscopic procedure with MAC sedation.     Grover Hopson MD  8/20/2018  11:30 AM

## 2018-08-31 ENCOUNTER — HOSPITAL ENCOUNTER (OUTPATIENT)
Dept: CT IMAGING | Age: 68
Discharge: HOME | End: 2018-08-31
Attending: SPECIALIST
Payer: COMMERCIAL

## 2018-08-31 DIAGNOSIS — R10.9 RIGHT FLANK PAIN: ICD-10-CM

## 2018-08-31 DIAGNOSIS — R63.4 UNEXPLAINED WEIGHT LOSS: ICD-10-CM

## 2018-08-31 DIAGNOSIS — R19.00 PELVIC MASS: ICD-10-CM

## 2018-08-31 LAB — CREAT BLD-MCNC: 0.6 MG/DL (ref 0.6–1.3)

## 2018-08-31 PROCEDURE — 74177 CT ABD & PELVIS W/CONTRAST: CPT

## 2018-08-31 PROCEDURE — 74011636320 HC RX REV CODE- 636/320: Performed by: STUDENT IN AN ORGANIZED HEALTH CARE EDUCATION/TRAINING PROGRAM

## 2018-08-31 PROCEDURE — 82565 ASSAY OF CREATININE: CPT

## 2018-08-31 RX ADMIN — IOPAMIDOL 100 ML: 755 INJECTION, SOLUTION INTRAVENOUS at 08:00

## 2018-11-19 ENCOUNTER — OFFICE VISIT (OUTPATIENT)
Dept: FAMILY MEDICINE CLINIC | Age: 68
End: 2018-11-19

## 2018-11-19 VITALS
HEART RATE: 58 BPM | TEMPERATURE: 97.5 F | OXYGEN SATURATION: 97 % | SYSTOLIC BLOOD PRESSURE: 112 MMHG | RESPIRATION RATE: 20 BRPM | HEIGHT: 66 IN | DIASTOLIC BLOOD PRESSURE: 80 MMHG | BODY MASS INDEX: 19.29 KG/M2 | WEIGHT: 120 LBS

## 2018-11-19 DIAGNOSIS — M54.12 CERVICAL RADICULOPATHY: ICD-10-CM

## 2018-11-19 DIAGNOSIS — M54.2 NECK PAIN: Primary | ICD-10-CM

## 2018-11-19 DIAGNOSIS — M62.838 MUSCLE SPASMS OF NECK: ICD-10-CM

## 2018-11-19 RX ORDER — HYDROCODONE BITARTRATE AND ACETAMINOPHEN 5; 325 MG/1; MG/1
1 TABLET ORAL
Qty: 20 TAB | Refills: 0 | Status: SHIPPED | OUTPATIENT
Start: 2018-11-19 | End: 2022-04-15
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
## 2018-11-30 ENCOUNTER — TELEPHONE (OUTPATIENT)
Dept: FAMILY MEDICINE CLINIC | Age: 68
End: 2018-11-30

## 2018-11-30 NOTE — TELEPHONE ENCOUNTER
Patient called and stated that she wanted to let Dr. Des Marquez know that she has started PT at [facility] and has had 2 sessions and has 2 set up for next week.   STAN

## 2019-11-14 ENCOUNTER — TELEPHONE (OUTPATIENT)
Dept: FAMILY MEDICINE CLINIC | Age: 69
End: 2019-11-14

## 2019-11-14 NOTE — TELEPHONE ENCOUNTER
----- Message from Karen Phan sent at 11/13/2019  5:12 PM EST -----  Regarding: Dr. Merced Reyes   General Message/Vendor Calls    Caller's first and last name:      Reason for call:Pt is requesting for the PCP to give him a call       Callback required yes/no and why:Y      Best contact number(s): [phone]      Details to clarify the request:      Karen Phan
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
## 2022-02-18 ENCOUNTER — TRANSCRIBE ORDER (OUTPATIENT)
Dept: SCHEDULING | Age: 72
End: 2022-02-18

## 2022-02-18 DIAGNOSIS — M43.16 SPONDYLOLISTHESIS, LUMBAR REGION: Primary | ICD-10-CM

## 2022-03-02 ENCOUNTER — HOSPITAL ENCOUNTER (OUTPATIENT)
Dept: MRI IMAGING | Age: 72
Discharge: HOME | End: 2022-03-02
Attending: NEUROLOGICAL SURGERY
Payer: MEDICARE

## 2022-03-02 DIAGNOSIS — M43.16 SPONDYLOLISTHESIS, LUMBAR REGION: ICD-10-CM

## 2022-03-02 PROCEDURE — 72148 MRI LUMBAR SPINE W/O DYE: CPT

## 2022-04-15 ENCOUNTER — HOSPITAL ENCOUNTER (OUTPATIENT)
Dept: PREADMISSION TESTING | Age: 72
Discharge: HOME | End: 2022-04-15
Payer: MEDICARE

## 2022-04-15 VITALS
WEIGHT: 132.5 LBS | HEART RATE: 60 BPM | DIASTOLIC BLOOD PRESSURE: 73 MMHG | TEMPERATURE: 98.2 F | HEIGHT: 66 IN | RESPIRATION RATE: 18 BRPM | BODY MASS INDEX: 21.29 KG/M2 | SYSTOLIC BLOOD PRESSURE: 156 MMHG

## 2022-04-15 LAB
ANION GAP SERPL CALC-SCNC: 7 MMOL/L (ref 5–15)
ATRIAL RATE: 59 BPM
BASOPHILS # BLD: 0 K/UL (ref 0–0.1)
BASOPHILS NFR BLD: 1 % (ref 0–1)
BUN SERPL-MCNC: 29 MG/DL (ref 6–20)
BUN/CREAT SERPL: 35 (ref 12–20)
CALCIUM SERPL-MCNC: 9 MG/DL (ref 8.5–10.1)
CALCULATED P AXIS, ECG09: 74 DEGREES
CALCULATED R AXIS, ECG10: 82 DEGREES
CALCULATED T AXIS, ECG11: 12 DEGREES
CHLORIDE SERPL-SCNC: 100 MMOL/L (ref 97–108)
CO2 SERPL-SCNC: 27 MMOL/L (ref 21–32)
CREAT SERPL-MCNC: 0.82 MG/DL (ref 0.55–1.02)
DIAGNOSIS, 93000: NORMAL
DIFFERENTIAL METHOD BLD: ABNORMAL
EOSINOPHIL # BLD: 0.1 K/UL (ref 0–0.4)
EOSINOPHIL NFR BLD: 2 % (ref 0–7)
ERYTHROCYTE [DISTWIDTH] IN BLOOD BY AUTOMATED COUNT: 13.2 % (ref 11.5–14.5)
GLUCOSE SERPL-MCNC: 103 MG/DL (ref 65–100)
HCT VFR BLD AUTO: 36.1 % (ref 35–47)
HGB BLD-MCNC: 11.7 G/DL (ref 11.5–16)
IMM GRANULOCYTES # BLD AUTO: 0 K/UL (ref 0–0.04)
IMM GRANULOCYTES NFR BLD AUTO: 1 % (ref 0–0.5)
LYMPHOCYTES # BLD: 1.8 K/UL (ref 0.8–3.5)
LYMPHOCYTES NFR BLD: 30 % (ref 12–49)
MCH RBC QN AUTO: 29.9 PG (ref 26–34)
MCHC RBC AUTO-ENTMCNC: 32.4 G/DL (ref 30–36.5)
MCV RBC AUTO: 92.3 FL (ref 80–99)
MONOCYTES # BLD: 0.6 K/UL (ref 0–1)
MONOCYTES NFR BLD: 9 % (ref 5–13)
NEUTS SEG # BLD: 3.5 K/UL (ref 1.8–8)
NEUTS SEG NFR BLD: 57 % (ref 32–75)
NRBC # BLD: 0 K/UL (ref 0–0.01)
NRBC BLD-RTO: 0 PER 100 WBC
P-R INTERVAL, ECG05: 168 MS
PLATELET # BLD AUTO: 245 K/UL (ref 150–400)
PMV BLD AUTO: 10.5 FL (ref 8.9–12.9)
POTASSIUM SERPL-SCNC: 4.3 MMOL/L (ref 3.5–5.1)
Q-T INTERVAL, ECG07: 426 MS
QRS DURATION, ECG06: 130 MS
QTC CALCULATION (BEZET), ECG08: 421 MS
RBC # BLD AUTO: 3.91 M/UL (ref 3.8–5.2)
SODIUM SERPL-SCNC: 134 MMOL/L (ref 136–145)
VENTRICULAR RATE, ECG03: 59 BPM
WBC # BLD AUTO: 6 K/UL (ref 3.6–11)

## 2022-04-15 PROCEDURE — 93005 ELECTROCARDIOGRAM TRACING: CPT

## 2022-04-15 PROCEDURE — 85025 COMPLETE CBC W/AUTO DIFF WBC: CPT

## 2022-04-15 PROCEDURE — 36415 COLL VENOUS BLD VENIPUNCTURE: CPT

## 2022-04-15 PROCEDURE — 80048 BASIC METABOLIC PNL TOTAL CA: CPT

## 2022-04-15 RX ORDER — HYDROCODONE BITARTRATE AND ACETAMINOPHEN 5; 325 MG/1; MG/1
1 TABLET ORAL
Status: ON HOLD | COMMUNITY
End: 2022-04-22 | Stop reason: SDUPTHER

## 2022-04-15 NOTE — PERIOP NOTES
[facility] INSTRUCTIONS  ERAS    Surgery Date:   04/21/2022    [facility] staff will call you between 4 PM- 8 PM the day before surgery with your arrival time. If your surgery is on a Monday, we will call you the preceding Friday. Please call [phone] after 8 PM if you did not receive your arrival time. 1. Please report to [facility] Patient Access/Admitting on the 1st floor. Bring your insurance card, photo identification, and any copayment ( if applicable). 2. If you are going home the same day of your surgery, you must have a responsible adult to drive you home. You need to have a responsible adult to stay with you the first 24 hours after surgery and you should not drive a car for 24 hours following your surgery. 3. If you are being admitted to the hospital, please leave personal belongings/luggage in your car until you have an assigned hospital room number. 4. Please refer to [facility] book for Pre-operative Nutrition Plan. 5. Do NOT drink alcohol or smoke 24 hours before surgery. STOP smoking for 14 days prior as it helps with breathing and healing after surgery. 6. Please wear comfortable clothes. Wear your glasses instead of contacts. We ask that all money, jewelry and valuables be left at home. Wear no make up, particularly mascara, the day of surgery. 7.  All body piercings, rings, and jewelry need to be removed and left at home. Please remove any nail polish or artificial nails from your fingernails. Please wear your hair loose or down. Please no pony-tails, buns, or any metal hair accessories. If you shower the morning of surgery, please do not apply any lotions or powders afterwards. You may wear deodorant. Do not shave any body area within 24 hours of your surgery. 8. Please follow all instructions to avoid any potential surgical cancellation. 9. Should your physical condition change, (i.e. fever, cold, flu, etc.) please notify your surgeon as soon as possible.   10. It is important to be on time. If a situation occurs where you may be delayed, please call:  [phone] / [phone] on the day of surgery. 11. The Preadmission Testing staff can be reached at [phone]. 12. Special instructions: NONE    Current Outpatient Medications   Medication Sig    HYDROcodone-acetaminophen (Norco) 5-325 mg per tablet Take 1 Tablet by mouth every eight (8) hours as needed for Pain.  ascorbic acid, vitamin C, (VITAMIN C) 500 mg tablet Take 500 mg by mouth daily.  Cholecalciferol, Vitamin D3, (VITAMIN D3) 1,000 unit cap Take 2,000 Units by mouth daily.  vitamin b comp & c no.4 (SUPER B COMPLEX + C) 150 mg tab Take  by mouth daily. No current facility-administered medications for this encounter. 1. YOU MUST ONLY TAKE THESE MEDICATIONS THE MORNING OF SURGERY WITH A SIP OF WATER: NONE  2. MEDICATIONS TO TAKE THE MORNING OF SURGERY ONLY IF NEEDED: TYLENOL, HYDROCODONE-ACETAMINOPHEN-LAST  DOSE 4 HOURS PRIOR TO ARRIVAL   3. HOLD these prescription medications BEFORE surgery: PER PT STOPPED ALL SUPPLEMENTS ON 04/15/2022  4. Ask your surgeon/prescribing physician about when/if to STOP taking these medications: NONE  5. Stop all vitamins, herbal medicines and Aspirin containing products 7 days prior to surgery. Stop any non-steroidal anti-inflammatory drugs (i.e. Ibuprofen, Naproxen, Advil, Aleve) 3 days before surgery. You may take Tylenol. 6. If you are currently taking Aspirin, Plavix, Coumadin or any other blood-thinning/anticoagulant medication contact your prescribing physician for instructions. Eating and Drinking Before Surgery     You may eat a regular dinner at the usual time on the day before your surgery.  Do NOT eat any solid foods after midnight unless your arrival time at the hospital is 3pm or later.  You may drink clear liquids only from 12 midnight until 1 hour prior to your arrival time at the hospital on the day of your surgery.  Do NOT drink alcohol.  Clear liquids include:  o Water  o Fruit juices without pulp( i.e. apple juice)  o Carbonated beverages  o Black coffee (no cream/milk)  o Tea (no cream/milk)  o Gatorade   You may drink up to 12-16 ounces at one time every 4 hours between the hours of midnight and 1 hour before your arrival time at the hospital. Example- if your arrival time at the hospital is 6am, you may drink 12-16 ounces of clear liquids no later than 5am.   If your arrival time at the hospital is 3pm or later, you may eat a light breakfast before 8am.   A light breakfast includes:  o Toast or bagel (no butter)  o Black coffee (no cream/milk)  o Tea (no cream/milk)  o Fruit juices without pulp ( i.e. apple juice)  o Do NOT eat meat, eggs, vegetables or fruit   If you have any questions, please contact your surgeon's office. Incentive Spirometer        Using the incentive spirometer helps expand the small air sacs of your lungs, helps you breathe deeply, and helps improve your lung function. Use your incentive spirometer twice a day (10 breaths each time) prior to surgery. How to Use Your Incentive Spirometer:  1. Hold the incentive spirometer in an upright position. 2. Breathe out as usual.   3. Place the mouthpiece in your mouth and seal your lips tightly around it. 4. Take a deep breath. Breathe in slowly and as deeply as possible. Keep the blue flow rate guide between the arrows. 5. Hold your breath as long as possible. Then exhale slowly and allow the piston to fall to the bottom of the column. 6. Rest for a few seconds and repeat steps one through five at least 10 times. Opportunity given to ask and answer questions. Preventing Infections Before and After  Your Surgery    IMPORTANT INSTRUCTIONS    You play an important role in your health and preparation for surgery.  To reduce the germs on your skin you will need to shower with CHG soap (Chorhexidine gluconate 4%) two times before surgery. CHG soap (Hibiclens, Hex-A-Clens or store brand)   CHG soap will be provided at your Preadmission Testing (PAT) appointment.  If you do not have a PAT appointment before surgery, you may arrange to  CHG soap from our office or purchase CHG soap at a pharmacy, grocery or department store.  You need to purchase TWO 4 ounce bottles to use for your 2 showers. Steps to follow:  1. Wash your hair with your normal shampoo and your body with regular soap and rinse well to remove shampoo and soap from your skin. 2. Wet a clean washcloth and turn off the shower. 3. Put CHG soap on washcloth and apply to your entire body from the neck down. Do not use on your head, face or private parts(genitals). Do not use CHG soap on open sores, wounds or areas of skin irritation. 4. Wash you body gently for 5 minutes. Do not wash your skin too hard. This soap does not create lather. Pay special attention to your underarms and from your belly button to your feet. 5. Turn the shower back on and rinse well to get CHG soap off your body. 6. Pat your skin dry with a clean, dry towel. Do not apply lotions or moisturizer. 7. Put on clean clothes and sleep on fresh bed sheets and do not allow pets to sleep with you. Shower with CHG soap 2 times before your surgery   The evening before your surgery   The morning of your surgery      Tips to help prevent infections after your surgery:  1. Protect your surgical wound from germs:  ? Hand washing is the most important thing you and your caregivers can do to prevent infections. ? Keep your bandage clean and dry! ? Do not touch your surgical wound. 2. Use clean, freshly washed towels and washcloths every time you shower; do not share bath linens with others. 3. Until your surgical wound is healed, wear clothing and sleep on bed linens each day that are clean and freshly washed.   4. Do not allow pets to sleep in your bed with you or touch your surgical wound.  5. Do not smoke  smoking delays wound healing. This may be a good time to stop smoking. 6. If you have diabetes, it is important for you to manage your blood sugar levels properly before your surgery as well as after your surgery. Poorly managed blood sugar levels slow down wound healing and prevent you from healing completely. Patient Information Regarding COVID Restrictions    Day of Procedure     Please park in the parking deck or any designated visitor parking lot.  [facility].   On the day of surgery, please provide the cell phone number of the person who will be waiting for you to the Patient Access representative at the time of registration.  Please wear a mask on the day of your procedure.  We are now allowing two designated visitors per stay. Pediatric patients may have 2 designated visitors. These two people may come in with you on the day of your procedure.  No visitors under the age of 13.  The designated visitor must also wear a mask.  Once your procedure and the immediate recovery period is completed, a nurse in the recovery area will contact your designated visitor to inform them of your room number or to otherwise review other pertinent information regarding your care.  Social distancing practices are to be adhered to in waiting areas and the cafeteria. The patient was contacted in person. She verbalized understanding of all instructions does not  need reinforcement.

## 2022-04-17 LAB
BACTERIA SPEC CULT: NORMAL
BACTERIA SPEC CULT: NORMAL
SERVICE CMNT-IMP: NORMAL

## 2022-04-18 NOTE — PERIOP NOTES
PAT Nurse Practitioner   Pre-Operative Chart Review/Assessment:-ORTHOPEDIC/NEUROSURGICAL SPINE                Patient Name:  Hayward Spurling                                                           Age:   70 y.o.    :  1950     Today's Date:  2022     Date of PAT:   4/15/2022      Date of Surgery:    2022      Procedure(s):  L4-L5 Laminectomy and Fusion     Surgeon:   Dr. Milvia Calabrese:       1)  PCP: Dr. Jose G Valdez        2)  Cardiac Clearance: EKG and METs reviewed. No further cardiac evaluation requested. PAT EKG: sinus bradycardia, PAC's noted. 3)  Diabetic Treatment Consult: A1c not ordered. No hx of DM. BG-103 on PAT labs. 4)  Sleep Apnea evaluation:  Not indicated. SIMONE Score 2.       5)  Treatment for MRSA/Staph Aureus: Neg       6)  Additional Concerns: None. Vital Signs:         Vitals:    04/15/22 1530   BP: (!) 156/73   Pulse: 60   Resp: 18   Temp: 98.2 °F (36.8 °C)   Weight: 60.1 kg (132 lb 7.9 oz)   Height: 5' 6\" (1.676 m)   LMP: 2003          Preoperative Nutrition Screen (PIPPA)   Patient's Age: 70 y.o. Patient's BMI: Estimated body mass index is 21.39 kg/m² as calculated from the following:    Height as of this encounter: 5' 6\" (1.676 m). Weight as of this encounter: 60.1 kg (132 lb 7.9 oz). If the answer to any of the following is Yes, then recommend prescribe Oral Nutrition Supplements (ONS) for at least 5 days prior to surgery and/or order referral to dietitian for further assessment and nutrition therapy. 1. Does the patient have a documented serum albumin less than 3.0 within the last 90 days? Unknown = 0       2. Is patient's BMI less than 18.5 (or less than 20 if age over 72)? No = 0        3. Has the patient had an unplanned weight loss of 10% of body weight or more in the last 6 months? No = 0   4. Has the patient been eating less than 50% of their normal diet in the preceding week?  No = 0   PIPPA Score (number of Yes responses), 0-4 0     Plan:   2 daily immuno nutrition shakes, 5 days prior to surgery and 5 days post-operatively. 3 pre-surgery drinks. Electronically signed by Gatito Miller NP on 4/18/22 at 9:30 AM    ____________________________________________  PAST MEDICAL HISTORY  Past Medical History:   Diagnosis Date    Back pain     Chronic pain     lower back and into left hip    Phlebitis     DURING  PREGNANCY       ____________________________________________  PAST SURGICAL HISTORY  Past Surgical History:   Procedure Laterality Date    COLONOSCOPY N/A 8/20/2018    COLONOSCOPY performed by Agnieszka Marrero MD at [address] HX CYST INCISION AND DRAINAGE Right 2000    HX LUMBAR LAMINECTOMY  1995    L5-S1    HX REFRACTIVE SURGERY      HX TONSIL AND ADENOIDECTOMY      HX TUBAL LIGATION  1976    SC REMOVE LUMBAR ARTIFICIAL One [place], SINGLE      VASCULAR SURGERY PROCEDURE UNLIST Right     vein surgery     ____________________________________________  HOME MEDICATIONS    Current Outpatient Medications   Medication Sig    HYDROcodone-acetaminophen (Norco) 5-325 mg per tablet Take 1 Tablet by mouth every eight (8) hours as needed for Pain.  ascorbic acid, vitamin C, (VITAMIN C) 500 mg tablet Take 500 mg by mouth daily.  Cholecalciferol, Vitamin D3, (VITAMIN D3) 1,000 unit cap Take 2,000 Units by mouth daily.  vitamin b comp & c no.4 (SUPER B COMPLEX + C) 150 mg tab Take  by mouth daily. No current facility-administered medications for this encounter.      ____________________________________________  ALLERGIES  Allergies   Allergen Reactions    Pcn [Penicillins] Rash    Clarithromycin Nausea Only    Sulfa (Sulfonamide Antibiotics) Rash      ____________________________________________  SOCIAL HISTORY  Social History     Tobacco Use    Smoking status: Never Smoker    Smokeless tobacco: Never Used   Substance Use Topics    Alcohol use:  No ____________________________________________   Internal Administration   First Dose      Second Dose      ____________________________________________    Labs:     Hospital Outpatient Visit on 04/15/2022   Component Date Value Ref Range Status    WBC 04/15/2022 6.0  3.6 - 11.0 K/uL Final    RBC 04/15/2022 3.91  3.80 - 5.20 M/uL Final    HGB 04/15/2022 11.7  11.5 - 16.0 g/dL Final    HCT 04/15/2022 36.1  35.0 - 47.0 % Final    MCV 04/15/2022 92.3  80.0 - 99.0 FL Final    MCH 04/15/2022 29.9  26.0 - 34.0 PG Final    MCHC 04/15/2022 32.4  30.0 - 36.5 g/dL Final    RDW 04/15/2022 13.2  11.5 - 14.5 % Final    PLATELET 42/56/2683 066  150 - 400 K/uL Final    MPV 04/15/2022 10.5  8.9 - 12.9 FL Final    NRBC 04/15/2022 0.0  0  WBC Final    ABSOLUTE NRBC 04/15/2022 0.00  0.00 - 0.01 K/uL Final    NEUTROPHILS 04/15/2022 57  32 - 75 % Final    LYMPHOCYTES 04/15/2022 30  12 - 49 % Final    MONOCYTES 04/15/2022 9  5 - 13 % Final    EOSINOPHILS 04/15/2022 2  0 - 7 % Final    BASOPHILS 04/15/2022 1  0 - 1 % Final    IMMATURE GRANULOCYTES 04/15/2022 1* 0.0 - 0.5 % Final    ABS. NEUTROPHILS 04/15/2022 3.5  1.8 - 8.0 K/UL Final    ABS. LYMPHOCYTES 04/15/2022 1.8  0.8 - 3.5 K/UL Final    ABS. MONOCYTES 04/15/2022 0.6  0.0 - 1.0 K/UL Final    ABS. EOSINOPHILS 04/15/2022 0.1  0.0 - 0.4 K/UL Final    ABS. BASOPHILS 04/15/2022 0.0  0.0 - 0.1 K/UL Final    ABS. IMM.  GRANS. 04/15/2022 0.0  0.00 - 0.04 K/UL Final    DF 04/15/2022 AUTOMATED    Final    Sodium 04/15/2022 134* 136 - 145 mmol/L Final    Potassium 04/15/2022 4.3  3.5 - 5.1 mmol/L Final    Chloride 04/15/2022 100  97 - 108 mmol/L Final    CO2 04/15/2022 27  21 - 32 mmol/L Final    Anion gap 04/15/2022 7  5 - 15 mmol/L Final    Glucose 04/15/2022 103* 65 - 100 mg/dL Final    BUN 04/15/2022 29* 6 - 20 MG/DL Final    Creatinine 04/15/2022 0.82  0.55 - 1.02 MG/DL Final    BUN/Creatinine ratio 04/15/2022 35* 12 - 20   Final    GFR est AA 04/15/2022 >60  >60 ml/min/1.73m2 Final    GFR est non-AA 04/15/2022 >60  >60 ml/min/1.73m2 Final    Estimated GFR is calculated using the IDMS-traceable Modification of Diet in Renal Disease (MDRD) Study equation, reported for both  Americans (GFRAA) and non- Americans (GFRNA), and normalized to 1.73m2 body surface area. The physician must decide which value applies to the patient.  Calcium 04/15/2022 9.0  8.5 - 10.1 MG/DL Final    Special Requests: 04/15/2022 NO SPECIAL REQUESTS    Final    Culture result: 04/15/2022 MRSA NOT PRESENT    Final            Ventricular Rate 04/15/2022 59  BPM Final    Atrial Rate 04/15/2022 59  BPM Final    P-R Interval 04/15/2022 168  ms Final    QRS Duration 04/15/2022 130  ms Final    Q-T Interval 04/15/2022 426  ms Final    QTC Calculation (Bezet) 04/15/2022 421  ms Final    Calculated P Axis 04/15/2022 74  degrees Final    Calculated R Axis 04/15/2022 82  degrees Final    Calculated T Axis 04/15/2022 12  degrees Final    Diagnosis 04/15/2022    Final                    Value:Sinus bradycardia with premature supraventricular complexes  Nonspecific intraventricular block  No previous ECGs available  Confirmed by Charan Fuentes ([id number]) on 4/15/2022 5:54:26 PM          Skin:     Denies open wounds, cuts, sores, rashes or other areas of concern in PAT assessment.         Radha Dc NP

## 2022-04-20 ENCOUNTER — ANESTHESIA EVENT (OUTPATIENT)
Dept: SURGERY | Age: 72
End: 2022-04-20
Payer: MEDICARE

## 2022-04-21 ENCOUNTER — APPOINTMENT (OUTPATIENT)
Dept: GENERAL RADIOLOGY | Age: 72
End: 2022-04-21
Attending: NEUROLOGICAL SURGERY
Payer: MEDICARE

## 2022-04-21 ENCOUNTER — HOSPITAL ENCOUNTER (OUTPATIENT)
Age: 72
Setting detail: OBSERVATION
Discharge: HOME HEALTH | End: 2022-04-24
Attending: NEUROLOGICAL SURGERY | Admitting: NEUROLOGICAL SURGERY
Payer: MEDICARE

## 2022-04-21 ENCOUNTER — ANESTHESIA (OUTPATIENT)
Dept: SURGERY | Age: 72
End: 2022-04-21
Payer: MEDICARE

## 2022-04-21 DIAGNOSIS — Z98.1 S/P LUMBAR FUSION: Primary | ICD-10-CM

## 2022-04-21 PROBLEM — M48.062 LUMBAR STENOSIS WITH NEUROGENIC CLAUDICATION: Status: ACTIVE | Noted: 2022-04-21

## 2022-04-21 PROCEDURE — 74011250637 HC RX REV CODE- 250/637: Performed by: ANESTHESIOLOGY

## 2022-04-21 PROCEDURE — 74011250637 HC RX REV CODE- 250/637: Performed by: PHYSICIAN ASSISTANT

## 2022-04-21 PROCEDURE — 74011000250 HC RX REV CODE- 250: Performed by: NURSE ANESTHETIST, CERTIFIED REGISTERED

## 2022-04-21 PROCEDURE — 74011250636 HC RX REV CODE- 250/636: Performed by: ANESTHESIOLOGY

## 2022-04-21 PROCEDURE — P9045 ALBUMIN (HUMAN), 5%, 250 ML: HCPCS | Performed by: NURSE ANESTHETIST, CERTIFIED REGISTERED

## 2022-04-21 PROCEDURE — 77030040922 HC BLNKT HYPOTHRM STRY -A

## 2022-04-21 PROCEDURE — 77030010813: Performed by: NEUROLOGICAL SURGERY

## 2022-04-21 PROCEDURE — 65270000029 HC RM PRIVATE

## 2022-04-21 PROCEDURE — 2709999900 HC NON-CHARGEABLE SUPPLY: Performed by: NEUROLOGICAL SURGERY

## 2022-04-21 PROCEDURE — 77030002933 HC SUT MCRYL J&J -A: Performed by: NEUROLOGICAL SURGERY

## 2022-04-21 PROCEDURE — 77030008684 HC TU ET CUF COVD -B: Performed by: NURSE ANESTHETIST, CERTIFIED REGISTERED

## 2022-04-21 PROCEDURE — 74011250636 HC RX REV CODE- 250/636: Performed by: NURSE ANESTHETIST, CERTIFIED REGISTERED

## 2022-04-21 PROCEDURE — 77030029099 HC BN WAX SSPC -A: Performed by: NEUROLOGICAL SURGERY

## 2022-04-21 PROCEDURE — 77030021678 HC GLIDESCP STAT DISP VERT -B: Performed by: NURSE ANESTHETIST, CERTIFIED REGISTERED

## 2022-04-21 PROCEDURE — 74011250636 HC RX REV CODE- 250/636: Performed by: NEUROLOGICAL SURGERY

## 2022-04-21 PROCEDURE — 77030005513 HC CATH URETH FOL11 MDII -B: Performed by: NEUROLOGICAL SURGERY

## 2022-04-21 PROCEDURE — 77030013079 HC BLNKT BAIR HGGR 3M -A: Performed by: NURSE ANESTHETIST, CERTIFIED REGISTERED

## 2022-04-21 PROCEDURE — 74011000250 HC RX REV CODE- 250: Performed by: NEUROLOGICAL SURGERY

## 2022-04-21 PROCEDURE — C1713 ANCHOR/SCREW BN/BN,TIS/BN: HCPCS | Performed by: NEUROLOGICAL SURGERY

## 2022-04-21 PROCEDURE — 77030004391 HC BUR FLUT MEDT -C: Performed by: NEUROLOGICAL SURGERY

## 2022-04-21 PROCEDURE — 76010000173 HC OR TIME 3 TO 3.5 HR INTENSV-TIER 1: Performed by: NEUROLOGICAL SURGERY

## 2022-04-21 PROCEDURE — C1821 INTERSPINOUS IMPLANT: HCPCS | Performed by: NEUROLOGICAL SURGERY

## 2022-04-21 PROCEDURE — 65390000012 HC CONDITION CODE 44 OBSERVATION

## 2022-04-21 PROCEDURE — 76210000006 HC OR PH I REC 0.5 TO 1 HR: Performed by: NEUROLOGICAL SURGERY

## 2022-04-21 PROCEDURE — 77030014650 HC SEAL MTRX FLOSEL BAXT -C: Performed by: NEUROLOGICAL SURGERY

## 2022-04-21 PROCEDURE — 77030026438 HC STYL ET INTUB CARD -A: Performed by: NURSE ANESTHETIST, CERTIFIED REGISTERED

## 2022-04-21 PROCEDURE — 77030012890

## 2022-04-21 PROCEDURE — 74011250637 HC RX REV CODE- 250/637: Performed by: NEUROLOGICAL SURGERY

## 2022-04-21 PROCEDURE — 77030003029 HC SUT VCRL J&J -B: Performed by: NEUROLOGICAL SURGERY

## 2022-04-21 PROCEDURE — 77030038600 HC TU BPLR IRR DISP STRY -B: Performed by: NEUROLOGICAL SURGERY

## 2022-04-21 PROCEDURE — 76060000038 HC ANESTHESIA 3.5 TO 4 HR: Performed by: NEUROLOGICAL SURGERY

## 2022-04-21 DEVICE — SCREW 55840006545 5.5/6 MAS 6.5X45 CC .
Type: IMPLANTABLE DEVICE | Site: SPINE LUMBAR | Status: FUNCTIONAL
Brand: CD HORIZON® SPINAL SYSTEM

## 2022-04-21 DEVICE — GRAFT BNE SUB L CANC FRZN MORSELIZED W/ VIABLE CELL TRINITY: Type: IMPLANTABLE DEVICE | Site: SPINE LUMBAR | Status: FUNCTIONAL

## 2022-04-21 DEVICE — SPACER 6068076 CATALYFT PL LONG 7MM
Type: IMPLANTABLE DEVICE | Site: SPINE LUMBAR | Status: FUNCTIONAL
Brand: CATALYFT PL EXPANDABLE INTERBODY SYSTEM

## 2022-04-21 RX ORDER — PHENYLEPHRINE HCL IN 0.9% NACL 0.4MG/10ML
SYRINGE (ML) INTRAVENOUS AS NEEDED
Status: DISCONTINUED | OUTPATIENT
Start: 2022-04-21 | End: 2022-04-21 | Stop reason: HOSPADM

## 2022-04-21 RX ORDER — HYDROCODONE BITARTRATE AND ACETAMINOPHEN 5; 325 MG/1; MG/1
1 TABLET ORAL
Status: DISCONTINUED | OUTPATIENT
Start: 2022-04-21 | End: 2022-04-21 | Stop reason: SDUPTHER

## 2022-04-21 RX ORDER — OXYCODONE HYDROCHLORIDE 5 MG/1
5 TABLET ORAL
Status: DISCONTINUED | OUTPATIENT
Start: 2022-04-21 | End: 2022-04-21

## 2022-04-21 RX ORDER — ROCURONIUM BROMIDE 10 MG/ML
INJECTION, SOLUTION INTRAVENOUS AS NEEDED
Status: DISCONTINUED | OUTPATIENT
Start: 2022-04-21 | End: 2022-04-21 | Stop reason: HOSPADM

## 2022-04-21 RX ORDER — NALOXONE HYDROCHLORIDE 0.4 MG/ML
0.4 INJECTION, SOLUTION INTRAMUSCULAR; INTRAVENOUS; SUBCUTANEOUS AS NEEDED
Status: DISCONTINUED | OUTPATIENT
Start: 2022-04-21 | End: 2022-04-24 | Stop reason: HOSPADM

## 2022-04-21 RX ORDER — ONDANSETRON 2 MG/ML
4 INJECTION INTRAMUSCULAR; INTRAVENOUS
Status: ACTIVE | OUTPATIENT
Start: 2022-04-21 | End: 2022-04-22

## 2022-04-21 RX ORDER — EPHEDRINE SULFATE/0.9% NACL/PF 50 MG/5 ML
SYRINGE (ML) INTRAVENOUS AS NEEDED
Status: DISCONTINUED | OUTPATIENT
Start: 2022-04-21 | End: 2022-04-21 | Stop reason: HOSPADM

## 2022-04-21 RX ORDER — SODIUM CHLORIDE 0.9 % (FLUSH) 0.9 %
5-40 SYRINGE (ML) INJECTION AS NEEDED
Status: DISCONTINUED | OUTPATIENT
Start: 2022-04-21 | End: 2022-04-24 | Stop reason: HOSPADM

## 2022-04-21 RX ORDER — CYCLOBENZAPRINE HCL 10 MG
5 TABLET ORAL
Status: DISCONTINUED | OUTPATIENT
Start: 2022-04-21 | End: 2022-04-24 | Stop reason: HOSPADM

## 2022-04-21 RX ORDER — ACETAMINOPHEN 325 MG/1
650 TABLET ORAL EVERY 6 HOURS
Status: DISCONTINUED | OUTPATIENT
Start: 2022-04-21 | End: 2022-04-22

## 2022-04-21 RX ORDER — FENTANYL CITRATE 50 UG/ML
25 INJECTION, SOLUTION INTRAMUSCULAR; INTRAVENOUS
Status: COMPLETED | OUTPATIENT
Start: 2022-04-21 | End: 2022-04-21

## 2022-04-21 RX ORDER — SODIUM CHLORIDE 9 MG/ML
100 INJECTION, SOLUTION INTRAVENOUS CONTINUOUS
Status: DISPENSED | OUTPATIENT
Start: 2022-04-21 | End: 2022-04-22

## 2022-04-21 RX ORDER — MIDAZOLAM HYDROCHLORIDE 1 MG/ML
INJECTION, SOLUTION INTRAMUSCULAR; INTRAVENOUS AS NEEDED
Status: DISCONTINUED | OUTPATIENT
Start: 2022-04-21 | End: 2022-04-21 | Stop reason: HOSPADM

## 2022-04-21 RX ORDER — DIAZEPAM 5 MG/1
5 TABLET ORAL
Status: DISCONTINUED | OUTPATIENT
Start: 2022-04-21 | End: 2022-04-24 | Stop reason: HOSPADM

## 2022-04-21 RX ORDER — SODIUM CHLORIDE 9 MG/ML
INJECTION, SOLUTION INTRAVENOUS
Status: DISCONTINUED | OUTPATIENT
Start: 2022-04-21 | End: 2022-04-21 | Stop reason: HOSPADM

## 2022-04-21 RX ORDER — SODIUM CHLORIDE, SODIUM LACTATE, POTASSIUM CHLORIDE, CALCIUM CHLORIDE 600; 310; 30; 20 MG/100ML; MG/100ML; MG/100ML; MG/100ML
50 INJECTION, SOLUTION INTRAVENOUS CONTINUOUS
Status: DISCONTINUED | OUTPATIENT
Start: 2022-04-21 | End: 2022-04-21 | Stop reason: HOSPADM

## 2022-04-21 RX ORDER — SODIUM CHLORIDE 0.9 % (FLUSH) 0.9 %
5-40 SYRINGE (ML) INJECTION EVERY 8 HOURS
Status: DISCONTINUED | OUTPATIENT
Start: 2022-04-21 | End: 2022-04-21 | Stop reason: HOSPADM

## 2022-04-21 RX ORDER — DOCUSATE SODIUM 100 MG/1
100 CAPSULE, LIQUID FILLED ORAL 2 TIMES DAILY
Status: DISCONTINUED | OUTPATIENT
Start: 2022-04-21 | End: 2022-04-24 | Stop reason: HOSPADM

## 2022-04-21 RX ORDER — NEOSTIGMINE METHYLSULFATE 1 MG/ML
INJECTION, SOLUTION INTRAVENOUS AS NEEDED
Status: DISCONTINUED | OUTPATIENT
Start: 2022-04-21 | End: 2022-04-21 | Stop reason: HOSPADM

## 2022-04-21 RX ORDER — HYDROMORPHONE HYDROCHLORIDE 1 MG/ML
0.2 INJECTION, SOLUTION INTRAMUSCULAR; INTRAVENOUS; SUBCUTANEOUS
Status: DISCONTINUED | OUTPATIENT
Start: 2022-04-21 | End: 2022-04-21 | Stop reason: HOSPADM

## 2022-04-21 RX ORDER — SODIUM CHLORIDE 0.9 % (FLUSH) 0.9 %
5-40 SYRINGE (ML) INJECTION EVERY 8 HOURS
Status: DISCONTINUED | OUTPATIENT
Start: 2022-04-21 | End: 2022-04-24 | Stop reason: HOSPADM

## 2022-04-21 RX ORDER — KETAMINE HYDROCHLORIDE 10 MG/ML
INJECTION, SOLUTION INTRAMUSCULAR; INTRAVENOUS AS NEEDED
Status: DISCONTINUED | OUTPATIENT
Start: 2022-04-21 | End: 2022-04-21 | Stop reason: HOSPADM

## 2022-04-21 RX ORDER — ACETAMINOPHEN 325 MG/1
650 TABLET ORAL ONCE
Status: COMPLETED | OUTPATIENT
Start: 2022-04-21 | End: 2022-04-21

## 2022-04-21 RX ORDER — SODIUM CHLORIDE 0.9 % (FLUSH) 0.9 %
5-40 SYRINGE (ML) INJECTION AS NEEDED
Status: DISCONTINUED | OUTPATIENT
Start: 2022-04-21 | End: 2022-04-21 | Stop reason: HOSPADM

## 2022-04-21 RX ORDER — DIPHENHYDRAMINE HCL 25 MG
25 CAPSULE ORAL
Status: DISCONTINUED | OUTPATIENT
Start: 2022-04-21 | End: 2022-04-24 | Stop reason: HOSPADM

## 2022-04-21 RX ORDER — GLYCOPYRROLATE 0.2 MG/ML
INJECTION INTRAMUSCULAR; INTRAVENOUS AS NEEDED
Status: DISCONTINUED | OUTPATIENT
Start: 2022-04-21 | End: 2022-04-21 | Stop reason: HOSPADM

## 2022-04-21 RX ORDER — DEXAMETHASONE SODIUM PHOSPHATE 4 MG/ML
INJECTION, SOLUTION INTRA-ARTICULAR; INTRALESIONAL; INTRAMUSCULAR; INTRAVENOUS; SOFT TISSUE AS NEEDED
Status: DISCONTINUED | OUTPATIENT
Start: 2022-04-21 | End: 2022-04-21 | Stop reason: HOSPADM

## 2022-04-21 RX ORDER — SODIUM CHLORIDE, SODIUM LACTATE, POTASSIUM CHLORIDE, CALCIUM CHLORIDE 600; 310; 30; 20 MG/100ML; MG/100ML; MG/100ML; MG/100ML
INJECTION, SOLUTION INTRAVENOUS
Status: DISCONTINUED | OUTPATIENT
Start: 2022-04-21 | End: 2022-04-21 | Stop reason: HOSPADM

## 2022-04-21 RX ORDER — HYDROMORPHONE HYDROCHLORIDE 1 MG/ML
0.5 INJECTION, SOLUTION INTRAMUSCULAR; INTRAVENOUS; SUBCUTANEOUS
Status: DISCONTINUED | OUTPATIENT
Start: 2022-04-21 | End: 2022-04-21

## 2022-04-21 RX ORDER — ACETAMINOPHEN 500 MG
1000 TABLET ORAL EVERY 6 HOURS
Status: DISCONTINUED | OUTPATIENT
Start: 2022-04-21 | End: 2022-04-21

## 2022-04-21 RX ORDER — MIDAZOLAM HYDROCHLORIDE 1 MG/ML
1 INJECTION, SOLUTION INTRAMUSCULAR; INTRAVENOUS AS NEEDED
Status: DISCONTINUED | OUTPATIENT
Start: 2022-04-21 | End: 2022-04-21 | Stop reason: HOSPADM

## 2022-04-21 RX ORDER — HYDROMORPHONE HYDROCHLORIDE 2 MG/ML
INJECTION, SOLUTION INTRAMUSCULAR; INTRAVENOUS; SUBCUTANEOUS AS NEEDED
Status: DISCONTINUED | OUTPATIENT
Start: 2022-04-21 | End: 2022-04-21 | Stop reason: HOSPADM

## 2022-04-21 RX ORDER — HYDROCODONE BITARTRATE AND ACETAMINOPHEN 10; 325 MG/1; MG/1
1 TABLET ORAL
Status: DISCONTINUED | OUTPATIENT
Start: 2022-04-21 | End: 2022-04-24 | Stop reason: HOSPADM

## 2022-04-21 RX ORDER — ONDANSETRON 2 MG/ML
INJECTION INTRAMUSCULAR; INTRAVENOUS AS NEEDED
Status: DISCONTINUED | OUTPATIENT
Start: 2022-04-21 | End: 2022-04-21 | Stop reason: HOSPADM

## 2022-04-21 RX ORDER — HYDROMORPHONE HYDROCHLORIDE 1 MG/ML
1 INJECTION, SOLUTION INTRAMUSCULAR; INTRAVENOUS; SUBCUTANEOUS
Status: DISCONTINUED | OUTPATIENT
Start: 2022-04-21 | End: 2022-04-24 | Stop reason: HOSPADM

## 2022-04-21 RX ORDER — FENTANYL CITRATE 50 UG/ML
INJECTION, SOLUTION INTRAMUSCULAR; INTRAVENOUS AS NEEDED
Status: DISCONTINUED | OUTPATIENT
Start: 2022-04-21 | End: 2022-04-21 | Stop reason: HOSPADM

## 2022-04-21 RX ORDER — OXYCODONE HYDROCHLORIDE 5 MG/1
10 TABLET ORAL
Status: DISCONTINUED | OUTPATIENT
Start: 2022-04-21 | End: 2022-04-21

## 2022-04-21 RX ORDER — LIDOCAINE HYDROCHLORIDE 10 MG/ML
0.1 INJECTION, SOLUTION EPIDURAL; INFILTRATION; INTRACAUDAL; PERINEURAL AS NEEDED
Status: DISCONTINUED | OUTPATIENT
Start: 2022-04-21 | End: 2022-04-21 | Stop reason: HOSPADM

## 2022-04-21 RX ORDER — PROPOFOL 10 MG/ML
INJECTION, EMULSION INTRAVENOUS AS NEEDED
Status: DISCONTINUED | OUTPATIENT
Start: 2022-04-21 | End: 2022-04-21 | Stop reason: HOSPADM

## 2022-04-21 RX ORDER — FENTANYL CITRATE 50 UG/ML
50 INJECTION, SOLUTION INTRAMUSCULAR; INTRAVENOUS AS NEEDED
Status: DISCONTINUED | OUTPATIENT
Start: 2022-04-21 | End: 2022-04-21 | Stop reason: HOSPADM

## 2022-04-21 RX ORDER — ALBUMIN HUMAN 50 G/1000ML
SOLUTION INTRAVENOUS AS NEEDED
Status: DISCONTINUED | OUTPATIENT
Start: 2022-04-21 | End: 2022-04-21 | Stop reason: HOSPADM

## 2022-04-21 RX ORDER — BUPIVACAINE HYDROCHLORIDE 2.5 MG/ML
INJECTION, SOLUTION EPIDURAL; INFILTRATION; INTRACAUDAL AS NEEDED
Status: DISCONTINUED | OUTPATIENT
Start: 2022-04-21 | End: 2022-04-21 | Stop reason: HOSPADM

## 2022-04-21 RX ORDER — HYDROCODONE BITARTRATE AND ACETAMINOPHEN 5; 325 MG/1; MG/1
1 TABLET ORAL
Status: DISCONTINUED | OUTPATIENT
Start: 2022-04-21 | End: 2022-04-24 | Stop reason: HOSPADM

## 2022-04-21 RX ORDER — ONDANSETRON 2 MG/ML
4 INJECTION INTRAMUSCULAR; INTRAVENOUS AS NEEDED
Status: DISCONTINUED | OUTPATIENT
Start: 2022-04-21 | End: 2022-04-21 | Stop reason: HOSPADM

## 2022-04-21 RX ADMIN — HYDROMORPHONE HYDROCHLORIDE 0.5 MG: 2 INJECTION, SOLUTION INTRAMUSCULAR; INTRAVENOUS; SUBCUTANEOUS at 10:48

## 2022-04-21 RX ADMIN — ALBUMIN (HUMAN) 250 ML: 12.5 INJECTION, SOLUTION INTRAVENOUS at 11:31

## 2022-04-21 RX ADMIN — SODIUM CHLORIDE, PRESERVATIVE FREE 10 ML: 5 INJECTION INTRAVENOUS at 16:00

## 2022-04-21 RX ADMIN — SODIUM CHLORIDE, POTASSIUM CHLORIDE, SODIUM LACTATE AND CALCIUM CHLORIDE: 600; 310; 30; 20 INJECTION, SOLUTION INTRAVENOUS at 10:48

## 2022-04-21 RX ADMIN — SODIUM CHLORIDE, POTASSIUM CHLORIDE, SODIUM LACTATE AND CALCIUM CHLORIDE: 600; 310; 30; 20 INJECTION, SOLUTION INTRAVENOUS at 12:26

## 2022-04-21 RX ADMIN — ROCURONIUM BROMIDE 20 MG: 10 SOLUTION INTRAVENOUS at 12:44

## 2022-04-21 RX ADMIN — SODIUM CHLORIDE, POTASSIUM CHLORIDE, SODIUM LACTATE AND CALCIUM CHLORIDE 50 ML/HR: 600; 310; 30; 20 INJECTION, SOLUTION INTRAVENOUS at 09:35

## 2022-04-21 RX ADMIN — Medication 10 MG: at 12:04

## 2022-04-21 RX ADMIN — ROCURONIUM BROMIDE 40 MG: 10 SOLUTION INTRAVENOUS at 10:58

## 2022-04-21 RX ADMIN — Medication 80 MCG: at 11:06

## 2022-04-21 RX ADMIN — MIDAZOLAM 2 MG: 1 INJECTION INTRAMUSCULAR; INTRAVENOUS at 10:48

## 2022-04-21 RX ADMIN — Medication 80 MCG: at 12:29

## 2022-04-21 RX ADMIN — FENTANYL CITRATE 25 MCG: 50 INJECTION, SOLUTION INTRAMUSCULAR; INTRAVENOUS at 14:59

## 2022-04-21 RX ADMIN — WATER 2 G: 1 INJECTION INTRAMUSCULAR; INTRAVENOUS; SUBCUTANEOUS at 19:00

## 2022-04-21 RX ADMIN — HYDROMORPHONE HYDROCHLORIDE 0.5 MG: 2 INJECTION, SOLUTION INTRAMUSCULAR; INTRAVENOUS; SUBCUTANEOUS at 14:07

## 2022-04-21 RX ADMIN — Medication 120 MCG: at 11:15

## 2022-04-21 RX ADMIN — Medication 80 MCG: at 11:52

## 2022-04-21 RX ADMIN — CYCLOBENZAPRINE 5 MG: 10 TABLET, FILM COATED ORAL at 16:34

## 2022-04-21 RX ADMIN — DEXAMETHASONE SODIUM PHOSPHATE 8 MG: 4 INJECTION, SOLUTION INTRAMUSCULAR; INTRAVENOUS at 11:10

## 2022-04-21 RX ADMIN — ROCURONIUM BROMIDE 10 MG: 10 SOLUTION INTRAVENOUS at 11:57

## 2022-04-21 RX ADMIN — Medication 10 MG: at 11:56

## 2022-04-21 RX ADMIN — ONDANSETRON HYDROCHLORIDE 4 MG: 2 INJECTION, SOLUTION INTRAMUSCULAR; INTRAVENOUS at 13:37

## 2022-04-21 RX ADMIN — Medication 10 MG: at 12:31

## 2022-04-21 RX ADMIN — Medication 2.5 MG: at 13:53

## 2022-04-21 RX ADMIN — FENTANYL CITRATE 25 MCG: 50 INJECTION, SOLUTION INTRAMUSCULAR; INTRAVENOUS at 14:37

## 2022-04-21 RX ADMIN — SODIUM CHLORIDE: 900 INJECTION, SOLUTION INTRAVENOUS at 12:25

## 2022-04-21 RX ADMIN — FENTANYL CITRATE 100 MCG: 50 INJECTION, SOLUTION INTRAMUSCULAR; INTRAVENOUS at 10:58

## 2022-04-21 RX ADMIN — WATER 2 G: 1 INJECTION INTRAMUSCULAR; INTRAVENOUS; SUBCUTANEOUS at 11:10

## 2022-04-21 RX ADMIN — Medication 30 MG: at 10:58

## 2022-04-21 RX ADMIN — Medication 10 MG: at 11:37

## 2022-04-21 RX ADMIN — FENTANYL CITRATE 25 MCG: 50 INJECTION, SOLUTION INTRAMUSCULAR; INTRAVENOUS at 14:53

## 2022-04-21 RX ADMIN — Medication 10 MG: at 13:15

## 2022-04-21 RX ADMIN — PROPOFOL 110 MG: 10 INJECTION, EMULSION INTRAVENOUS at 10:58

## 2022-04-21 RX ADMIN — HYDROCODONE BITARTRATE AND ACETAMINOPHEN 1 TABLET: 5; 325 TABLET ORAL at 22:02

## 2022-04-21 RX ADMIN — ACETAMINOPHEN 650 MG: 325 TABLET ORAL at 09:35

## 2022-04-21 RX ADMIN — HYDROCODONE BITARTRATE AND ACETAMINOPHEN 1 TABLET: 5; 325 TABLET ORAL at 16:34

## 2022-04-21 RX ADMIN — FENTANYL CITRATE 25 MCG: 50 INJECTION, SOLUTION INTRAMUSCULAR; INTRAVENOUS at 14:43

## 2022-04-21 RX ADMIN — SODIUM CHLORIDE 100 ML/HR: 9 INJECTION, SOLUTION INTRAVENOUS at 15:17

## 2022-04-21 RX ADMIN — GLYCOPYRROLATE 0.4 MG: 0.2 INJECTION, SOLUTION INTRAMUSCULAR; INTRAVENOUS at 13:53

## 2022-04-21 RX ADMIN — Medication 80 MCG: at 11:21

## 2022-04-21 RX ADMIN — ACETAMINOPHEN 650 MG: 325 TABLET ORAL at 19:01

## 2022-04-21 NOTE — PERIOP NOTES
Patient's daughter, Shadia Garay, called and updated mother is still in preop area and will be updated by OR RN when they get started. Also updated surgery will be at least 3 hours.

## 2022-04-21 NOTE — PROGRESS NOTES
Problem: Falls - Risk of  Goal: *Absence of Falls  Description: Document Alonzo Gregg Fall Risk and appropriate interventions in the flowsheet.   Outcome: Progressing Towards Goal  Note: Fall Risk Interventions:  Mobility Interventions: PT Consult for mobility concerns,PT Consult for assist device competence,OT consult for ADLs         Medication Interventions: Patient to call before getting OOB    Elimination Interventions: Patient to call for help with toileting needs,Call light in reach

## 2022-04-21 NOTE — BRIEF OP NOTE
Brief Postoperative Note    Patient: Silverio Loredo  YOB: 1950  MRN: [id number]    Date of Procedure: 4/21/2022     Pre-Op Diagnosis: LUMBAR STENOSIS WITH INSTABILITY    Post-Op Diagnosis: Same as preoperative diagnosis. Procedure(s):  L4,5 LAMINECTOMY DISCECTOMY WITH INSTRUMENTED FUSION POSTERIOR LUMBAR INTERBODY FUSION AND PEDICLE SCREWS (O-ARM)    Surgeon(s):  Peña Metz MD    Surgical Assistant: Surg Asst-1: Kory General    Anesthesia: General     Estimated Blood Loss (mL): 661     Complications: None    Specimens: * No specimens in log *     Implants:   Implant Name Type Inv. Item Serial No.  Lot No. LRB No. Used Action   GRAFT BNE SUB L CANC FRZN MORSELIZED W/ VIABLE CELL [employer] - [id number]  GRAFT BNE SUB L CANC FRZN MORSELIZED W/ VIABLE CELL [employer] [id number] [employer] N/A N/A 1 Implanted   SPACER SPNL LNG 7 MM CATALYFT PL - SN/A  SPACER SPNL LNG 7 MM CATALYFT PL N/A [employer]WD [id number] N/A 1 Implanted   SCREW SPNL L45MM DIA6. 5MM POST THORACOLUMBOSACRAL CO CHROM - SN/A  SCREW SPNL L45MM DIA6. 5MM POST THORACOLUMBOSACRAL CO CHROM N/A [employer]WD N/A N/A 3 Implanted   SCREW SPNL L40MM DIA6. 5MM POST THORACOLUMBOSACRAL CO CHROM - SN/A  SCREW SPNL L40MM DIA6. 5MM POST THORACOLUMBOSACRAL CO CHROM N/A [employer]WD N/A N/A 1 Implanted   SET SCR SPNL L6MM DIA5. 5MM TI BRK OFF ALONDRA W/ DETACH [address] - SN/A  SET SCR SPNL L6MM DIA5. 5MM TI BRK OFF ALONDRA W/ DETACH [address] N/A [employer]WD N/A N/A 4 Implanted   LEN SPNL L35MM DIA5. 5MM ANT POST THORACOLUMBOSACRAL TI - SN/A  LEN SPNL L35MM DIA5. 5MM ANT POST THORACOLUMBOSACRAL TI N/A [employer]WD N/A N/A 2 Implanted       Drains:   Hemovac Right; Lower Back (Active)   Site Assessment Clean, dry, & intact 04/21/22 1338   Dressing Status Clean, dry, & intact 04/21/22 1338   Drainage Description Sanguinous 04/21/22 1338   Status Patent; Charged;Draining 04/21/22 1336       Findings: stenosis and hnp      Electronically Signed by Efren Chamberlain MD on 4/21/2022 at 1:50 PM

## 2022-04-21 NOTE — PROGRESS NOTES
Spine Surgery Progress Note    Admit Date: 4/21/2022   LOS: 0 days      Daily Progress Note: 4/21/2022    POD:Day of Surgery    S/P: Procedure(s):  L4,5 LAMINECTOMY DISCECTOMY WITH INSTRUMENTED FUSION POSTERIOR LUMBAR INTERBODY FUSION AND PEDICLE SCREWS (O-ARM)    Visit Vitals  BP (!) 130/54   Pulse 62   Temp 97.9 °F (36.6 °C)   Resp 17   Ht 5' 6\" (1.676 m)   Wt 60.1 kg (132 lb 7.9 oz)   SpO2 93%   BMI 21.39 kg/m²      Lab Results   Component Value Date/Time    HGB 11.7 04/15/2022 03:30 PM       Patient doing well overall. No nausea or vomiting. Pain well controlled on current medications. Complaints of muscle spasms  Denies nausea, vomiting, fever, chills, chest pain, dyspnea, headache. Voiding status: cortez    Calves soft/NTTP Bilaterally. Moving LE well. Neurocirculatory exam WNL. Motor and sensation intact. Incision OK; no drainage. Dressing clean and dry. Drain intact    Plan:  -Pain control - pt prefers hydrocodone over oxycodone  -PT/OT; in lumbar brace  -Maintain cortez overnight  -ADAT  -Monitor drain output  -Draw Hgb tomorrow AM  -Bowel regimen  -Daily dressing changes to incision  -[facility]     Discharge pending. All questions were answered. Daughter at bedside.     SALMA Painter

## 2022-04-21 NOTE — ANESTHESIA POSTPROCEDURE EVALUATION
Post-Anesthesia Evaluation and Assessment    Patient: Yris Dumont MRN: [id number]  SSN: [id number]    YOB: 1950  Age: 70 y.o. Sex: female      I have evaluated the patient and they are stable and ready for discharge from the PACU. Cardiovascular Function/Vital Signs  Visit Vitals  BP (!) 124/47   Pulse 64   Temp 36.7 °C (98 °F)   Resp 15   Ht 5' 6\" (1.676 m)   Wt 60.1 kg (132 lb 7.9 oz)   SpO2 94%   BMI 21.39 kg/m²       Patient is status post General anesthesia for Procedure(s):  L4,5 LAMINECTOMY DISCECTOMY WITH INSTRUMENTED FUSION POSTERIOR LUMBAR INTERBODY FUSION AND PEDICLE SCREWS (O-ARM). Nausea/Vomiting: None    Postoperative hydration reviewed and adequate. Pain:  Pain Scale 1: Numeric (0 - 10) (04/21/22 1443)  Pain Intensity 1: 6 (04/21/22 1443)   Managed    Neurological Status:   Neuro (WDL): Within Defined Limits (04/21/22 1421)   At baseline    Mental Status, Level of Consciousness: Alert and  oriented to person, place, and time    Pulmonary Status:   O2 Device: None (Room air) (04/21/22 1421)   Adequate oxygenation and airway patent    Complications related to anesthesia: None    Post-anesthesia assessment completed.  No concerns    Signed By: Abram Lay MD     April 21, 2022

## 2022-04-21 NOTE — PERIOP NOTES
FLOSEAL HEMOSTATIC MATRIX, 10mL  REF - [id number]  LOT - [id number]  EXP - 02/01/2024    SURGIFOAM SIZE 12-7  REF - 1972  LOT - [id number]  EXP - 07/08/2025

## 2022-04-21 NOTE — PERIOP NOTES
TRANSFER - OUT REPORT:    Verbal report given to Jun Jacobson RN (name) on Dudley Reardon  being transferred to [facility] (unit) for routine post - op       Report consisted of patients Situation, Background, Assessment and   Recommendations(SBAR). Information from the following report(s) SBAR, OR Summary, Procedure Summary, MAR and Recent Results was reviewed with the receiving nurse. Lines:   Peripheral IV 04/21/22 Left Arm (Active)   Site Assessment Clean, dry, & intact 04/21/22 1421   Phlebitis Assessment 0 04/21/22 1421   Infiltration Assessment 0 04/21/22 1421   Dressing Status Clean, dry, & intact 04/21/22 1421   Dressing Type Transparent;Tape 04/21/22 1421   Hub Color/Line Status Infusing 04/21/22 1421   Action Taken Open ports on tubing capped 04/21/22 1421   Alcohol Cap Used Yes 04/21/22 1421       Peripheral IV 04/21/22 Right Hand (Active)   Site Assessment Clean, dry, & intact 04/21/22 1421   Phlebitis Assessment 0 04/21/22 1421   Infiltration Assessment 0 04/21/22 1421   Dressing Status Clean, dry, & intact 04/21/22 1421   Dressing Type Transparent 04/21/22 1421   Hub Color/Line Status Capped 04/21/22 1421   Action Taken Open ports on tubing capped 04/21/22 1421   Alcohol Cap Used Yes 04/21/22 1421        Opportunity for questions and clarification was provided.       Patient transported with:   O2 @ 2 liters

## 2022-04-21 NOTE — ANESTHESIA PREPROCEDURE EVALUATION
Anesthetic History   No history of anesthetic complications            Review of Systems / Medical History  Patient summary reviewed, nursing notes reviewed and pertinent labs reviewed    Pulmonary  Within defined limits                 Neuro/Psych   Within defined limits           Cardiovascular                  Exercise tolerance: >4 METS     GI/Hepatic/Renal               Comments: screen Endo/Other        Arthritis     Other Findings              Physical Exam    Airway  Mallampati: I  TM Distance: 4 - 6 cm  Neck ROM: normal range of motion   Mouth opening: Normal     Cardiovascular    Rhythm: regular  Rate: normal         Dental  No notable dental hx       Pulmonary  Breath sounds clear to auscultation               Abdominal         Other Findings            Anesthetic Plan    ASA: 2  Anesthesia type: general          Induction: Intravenous  Anesthetic plan and risks discussed with: Patient

## 2022-04-21 NOTE — ROUTINE PROCESS
Patient: Rudy Judge MRN: [id number]  SSN: [id number]   YOB: 1950  Age: 70 y.o. Sex: female     Patient is status post Procedure(s):  L4,5 LAMINECTOMY DISCECTOMY WITH INSTRUMENTED FUSION POSTERIOR LUMBAR INTERBODY FUSION AND PEDICLE SCREWS (O-ARM). Surgeon(s) and Role:     * Sheri Moulton MD - Primary    Local/Dose/Irrigation:                    Peripheral IV 04/21/22 Left Arm (Active)   Site Assessment Clean, dry, & intact 04/21/22 0931   Phlebitis Assessment 0 04/21/22 0931   Infiltration Assessment 0 04/21/22 0931   Dressing Status Clean, dry, & intact; New 04/21/22 0931   Dressing Type Transparent;Tape 04/21/22 0931   Hub Color/Line Status Pink 04/21/22 0931       Peripheral IV 04/21/22 Right Hand (Active)          Hemovac Right; Lower Back (Active)   Site Assessment Clean, dry, & intact 04/21/22 1338   Dressing Status Clean, dry, & intact 04/21/22 1338   Drainage Description Sanguinous 04/21/22 1338   Status Patent; Charged;Draining 04/21/22 1338      Airway - Endotracheal Tube 04/21/22 Oral (Active)                   Dressing/Packing:  Incision 04/21/22 Back Posterior-Dressing/Treatment: Skin glue;Band-Aid/Adhesive bandage (04/21/22 1341)    Splint/Cast:  ]    Other:

## 2022-04-21 NOTE — PROGRESS NOTES
Occupational Therapy   04/21/22    Orders received, chart review completed. Note patient POD #0 s/p lumbar laminectomy, discectomy, and fusion, not indicated as a fast track. OT will follow up tomorrow for evaluation. Recommend OOB to chair three times a day for meals, self-completion of ADLs as able and medically stable.     Thank you,   Buzz Sweeney, OTD, OTR/L

## 2022-04-22 ENCOUNTER — HOME HEALTH ADMISSION (OUTPATIENT)
Dept: HOME HEALTH SERVICES | Facility: HOME HEALTH | Age: 72
End: 2022-04-22
Payer: MEDICARE

## 2022-04-22 LAB — HGB BLD-MCNC: 10.7 G/DL (ref 11.5–16)

## 2022-04-22 PROCEDURE — 97116 GAIT TRAINING THERAPY: CPT

## 2022-04-22 PROCEDURE — 74011250636 HC RX REV CODE- 250/636: Performed by: NEUROLOGICAL SURGERY

## 2022-04-22 PROCEDURE — 65270000029 HC RM PRIVATE

## 2022-04-22 PROCEDURE — 74011250637 HC RX REV CODE- 250/637: Performed by: PHYSICIAN ASSISTANT

## 2022-04-22 PROCEDURE — 36415 COLL VENOUS BLD VENIPUNCTURE: CPT

## 2022-04-22 PROCEDURE — 97161 PT EVAL LOW COMPLEX 20 MIN: CPT

## 2022-04-22 PROCEDURE — 97165 OT EVAL LOW COMPLEX 30 MIN: CPT

## 2022-04-22 PROCEDURE — 65390000012 HC CONDITION CODE 44 OBSERVATION

## 2022-04-22 PROCEDURE — 97530 THERAPEUTIC ACTIVITIES: CPT

## 2022-04-22 PROCEDURE — 85018 HEMOGLOBIN: CPT

## 2022-04-22 PROCEDURE — 74011000250 HC RX REV CODE- 250: Performed by: NEUROLOGICAL SURGERY

## 2022-04-22 PROCEDURE — 97535 SELF CARE MNGMENT TRAINING: CPT

## 2022-04-22 PROCEDURE — 74011250637 HC RX REV CODE- 250/637: Performed by: NEUROLOGICAL SURGERY

## 2022-04-22 PROCEDURE — 94760 N-INVAS EAR/PLS OXIMETRY 1: CPT

## 2022-04-22 PROCEDURE — L0637 LSO SC R ANT/POS PNL PRE CST: HCPCS

## 2022-04-22 RX ORDER — HYDROCODONE BITARTRATE AND ACETAMINOPHEN 5; 325 MG/1; MG/1
1 TABLET ORAL
Qty: 42 TABLET | Refills: 0 | Status: SHIPPED | OUTPATIENT
Start: 2022-04-22 | End: 2022-05-06

## 2022-04-22 RX ORDER — AMOXICILLIN 250 MG
1 CAPSULE ORAL DAILY
Qty: 30 TABLET | Refills: 0 | Status: SHIPPED | OUTPATIENT
Start: 2022-04-22

## 2022-04-22 RX ORDER — ACETAMINOPHEN 325 MG/1
325 TABLET ORAL EVERY 6 HOURS
Status: DISCONTINUED | OUTPATIENT
Start: 2022-04-22 | End: 2022-04-24 | Stop reason: HOSPADM

## 2022-04-22 RX ADMIN — SODIUM CHLORIDE, PRESERVATIVE FREE 7 ML: 5 INJECTION INTRAVENOUS at 22:00

## 2022-04-22 RX ADMIN — ACETAMINOPHEN 650 MG: 325 TABLET ORAL at 12:37

## 2022-04-22 RX ADMIN — ACETAMINOPHEN 650 MG: 325 TABLET ORAL at 00:12

## 2022-04-22 RX ADMIN — SODIUM CHLORIDE, PRESERVATIVE FREE 10 ML: 5 INJECTION INTRAVENOUS at 14:55

## 2022-04-22 RX ADMIN — DOCUSATE SODIUM 100 MG: 100 CAPSULE, LIQUID FILLED ORAL at 18:33

## 2022-04-22 RX ADMIN — HYDROCODONE BITARTRATE AND ACETAMINOPHEN 1 TABLET: 5; 325 TABLET ORAL at 15:21

## 2022-04-22 RX ADMIN — HYDROCODONE BITARTRATE AND ACETAMINOPHEN 1 TABLET: 5; 325 TABLET ORAL at 09:55

## 2022-04-22 RX ADMIN — HYDROCODONE BITARTRATE AND ACETAMINOPHEN 1 TABLET: 5; 325 TABLET ORAL at 20:34

## 2022-04-22 RX ADMIN — WATER 2 G: 1 INJECTION INTRAMUSCULAR; INTRAVENOUS; SUBCUTANEOUS at 02:37

## 2022-04-22 RX ADMIN — HYDROCODONE BITARTRATE AND ACETAMINOPHEN 1 TABLET: 5; 325 TABLET ORAL at 02:37

## 2022-04-22 RX ADMIN — ACETAMINOPHEN 650 MG: 325 TABLET ORAL at 06:24

## 2022-04-22 NOTE — OP NOTES
[address]   OPERATIVE REPORT    Name:  Jermaine Moore  MR#:  [id number]  :  1950  ACCOUNT #:  [de-identified]  DATE OF SERVICE:  2022      PREOPERATIVE DIAGNOSES:  L4-5 stenosis, degenerative spondylolisthesis, disk herniation, and previous L5-S1 laminectomy. POSTOPERATIVE DIAGNOSES:  L4-5 stenosis, degenerative spondylolisthesis, disk herniation, and previous L5-S1 laminectomy. PROCEDURES PERFORMED:  Complete L4 redo L5 laminectomy, bilateral medial facetectomies, right L4-5 diskectomy with instrumented fusion L4-5 using Medtronic posterior lumbar interbody fusion using a Catalyft expandable cage and Meghana allograft, Medtronic Solera pedicle screws and posterolateral fusion with allograft and autograft. SURGEON:  Michael Alford MD    ASSISTANT:  Mike Walsh SA    ANESTHESIA: General endotracheal anesthesia. COMPLICATIONS:  None. SPECIMENS REMOVED:  None. IMPLANTS:  As noted above. ESTIMATED BLOOD LOSS:  150-200 mL. COMMENTS:  Use of O-arm navigation for cannulation of pedicles. OPERATIVE INDICATIONS:  This is a 19-year-old female with progressive back and mainly right leg radiculopathy, previous L5-S1 decompression. Workup revealed dynamic instability at L4-5 and degenerative slip with high-grade stenosis and right paracentral disk herniation. After discussing treatment options and risks of surgery, informed consent was obtained. PROCEDURE:  The patient was taken to the operating room, placed under general endotracheal anesthesia. All necessary lines and monitors were placed. She was given appropriate dose of IV antibiotics. SCDs and Retana were placed. She was placed prone on the Delmy Screen table. All pressure points were padded. The lumbar sacral region was prepped and draped in standard sterile fashion. The previous operative incision was quite large.   I used the previous midline incision, opened this up with a skin knife, carried down with Bovie electrocautery in the avascular midline plane. Subperiosteal dissection was performed on the lamina bilaterally of L3 and L4. The previous L5 spinous process and the inferior lamina had been removed. I dissected this out and dissected out the L4-5 facet. I carried the dissection over the facet and dissected out the L3, L4, and L5 transverse processes, cleaned these off of soft tissue. I then used the O-arm navigation since we did not have any caudal spinous processes. I did a spin the O-arm and used this to cannulate the pedicles at L4 and L5 bilaterally and tapped them. There was good palpable feeling and everything looked good with navigation. I then removed the array. I then performed a complete laminectomy of L4 and redo laminectomy at L5, widely decompressing the thecal sac. There was marked bilateral facet arthropathy and bilateral stenosis compressing the lateral recess at this level as well as the proximal L5 nerve root. I decompressed past the L5 pedicles widely. I performed medial facetectomy especially on the right-hand side where I provided a generous amount of room. I retracted the thecal sac in the right L5 nerve root medially, it was somewhat stuck to the ventral spinal canal.  There was a disk that had herniated on the right side but also somewhat inferiorly. An annulotomy was performed and diskectomy was performed and widely decompressed, and then I cleaned out the entire disk space. I used the Medtronic interbody system, cleaned out the disk space and decorticated the endplates. Under fluoroscopy, I then placed Medtronic expandable Catalyft cage 7 x 26 mm in good position. This was expanded to essentially 14 mm height. There was good apposition. Everything appeared to be in good position on fluoroscopy. I then decorticated the facet and posterolateral gutters.   I placed remaining Meghana allograft and the local autograft from the laminectomy that had been cleaned off and morselized in the posterolateral gutters. I then under fluoroscopic guidance placed 6.5 x 45-mm screws bilaterally at L4 and L5. There was good purchase of the screws. They appeared to be in good position under fluoroscopy. Small 35-mm titanium rods were placed and the polyaxial heads locked down. The wound was copiously irrigated. Hemostasis was achieved. Retractors were removed. The drain was placed, brought out through a separate stab incision. The wound was then closed using 0 Vicryl to close the deep fascial layer, 2-0 Vicryl in the deep dermal layer, running 4-0 Monocryl in a subcuticular fashion. Wounds were cleaned and dried and dressed with sterile dressing. The patient was then flipped over, extubated, taken to the recovery room in stable condition.         Cody Vasques MD      [id number]  D:  04/21/2022 14:26  T:  04/21/2022 21:54  JOB #:  [id number]  CC:  Eriberto Cardoos MD

## 2022-04-22 NOTE — PROGRESS NOTES
Problem: Falls - Risk of  Goal: *Absence of Falls  Description: Document Gucci Sites Fall Risk and appropriate interventions in the flowsheet.   Outcome: Progressing Towards Goal  Note: Fall Risk Interventions:  Mobility Interventions: PT Consult for mobility concerns,PT Consult for assist device competence,OT consult for ADLs         Medication Interventions: Patient to call before getting OOB    Elimination Interventions: Patient to call for help with toileting needs,Call light in reach              Problem: Simple Spine Procedure:  Day of Surgery  Goal: Activity/Safety  Outcome: Progressing Towards Goal  Goal: Nutrition/Diet  Outcome: Progressing Towards Goal  Goal: Medications  Outcome: Progressing Towards Goal  Goal: Treatments/Interventions/Procedures  Outcome: Progressing Towards Goal

## 2022-04-22 NOTE — PROGRESS NOTES
Pt complaining of new numbness in R hip down to knee, new since surgery. Paged On-call neurosurgery, stated to countine to monitor.

## 2022-04-22 NOTE — PROGRESS NOTES
Problem: Mobility Impaired (Adult and Pediatric)  Goal: *Acute Goals and Plan of Care (Insert Text)  Description: FUNCTIONAL STATUS PRIOR TO ADMISSION: Patient was independent and active without use of DME. Pt reports she has not sat since January(stands or lies down). Also reports she hasn't really left her apartment since January. HOME SUPPORT PRIOR TO ADMISSION: The patient lived alone with children to provide assistance. Physical Therapy Goals  Initiated 4/22/2022    1. Patient will move from supine to sit and sit to supine , scoot up and down, and roll side to side in bed with independence within 4 days. 2. Patient will perform sit to stand with independence within 4 days. 3. Patient will ambulate with independence for 250 feet with the least restrictive device within 4 days. 4. Patient will ascend/descend 14 stairs with 1 handrail(s) with supervision/set-up within 4 days. 5. Patient will verbalize and demonstrate understanding of spinal precautions (No bending, lifting greater than 5 lbs, or twisting; log-roll technique; frequent repositioning as instructed) within 4 days. Outcome: Progressing Towards Goal   PHYSICAL THERAPY EVALUATION  Patient: Marty Falk (4 y.o. female)  Date: 4/22/2022  Primary Diagnosis: Lumbar stenosis with neurogenic claudication [M48.062]  Procedure(s) (LRB):  L4,5 LAMINECTOMY DISCECTOMY WITH INSTRUMENTED FUSION POSTERIOR LUMBAR INTERBODY FUSION AND PEDICLE SCREWS (O-ARM) (N/A) 1 Day Post-Op   Precautions:        ASSESSMENT  Based on the objective data described below, the patient presents with high anxiety over mobility, back pain, decreased endurance and generalized weakness following admission for L4-5 lami/fusion POD#1. Pt very particular regarding room set up, environment and donning of second gown and shoes prior to evaluation.  Pt requires at most CGA for mobility this day and tolerates gait distance of 100ft with slow gait speed but no overt LOB or safety concerns. Pt using wall railing for confidence though displays intact standing balance. Pt provided with LSO and fit appropriately. Educated on back precautions and activity modification. Pt reports she has not sat since January due to pain and only tolerates 5 minutes in the chair before requesting to return to supine. Current Level of Function Impacting Discharge (mobility/balance): CGA for mobility    Functional Outcome Measure: The patient scored 70/100 on the Barthel outcome measure. Other factors to consider for discharge: lives alone, 2nd floor apartment, back pain     Patient will benefit from skilled therapy intervention to address the above noted impairments. PLAN :  Recommendations and Planned Interventions: bed mobility training, transfer training, gait training, therapeutic exercises, patient and family training/education, and therapeutic activities      Frequency/Duration: Patient will be followed by physical therapy:  twice daily to address goals. Recommendation for discharge: (in order for the patient to meet his/her long term goals)  Physical therapy at least 2 days/week in the home     This discharge recommendation:  Has been made in collaboration with the attending provider and/or case management    IF patient discharges home will need the following DME: patient owns DME required for discharge         SUBJECTIVE:   Patient stated I need my shoes because I don't have any discs so it just shocks me too much to walk barefoot.     OBJECTIVE DATA SUMMARY:   HISTORY:    Past Medical History:   Diagnosis Date    Back pain     Chronic pain     lower back and into left hip    Phlebitis     DURING  PREGNANCY      Past Surgical History:   Procedure Laterality Date    COLONOSCOPY N/A 8/20/2018    COLONOSCOPY performed by Marilynn Rodriguez MD at [facility] ENDOSCOPY    HX CYST INCISION AND DRAINAGE Right 2000    HX LUMBAR LAMINECTOMY  1995    L5-S1    HX REFRACTIVE SURGERY      HX TONSIL AND ADENOIDECTOMY      HX TUBAL LIGATION  1976    DC REMOVE LUMBAR ARTIFICIAL DISC, SINGLE      VASCULAR SURGERY PROCEDURE UNLIST Right     vein surgery       Personal factors and/or comorbidities impacting plan of care: back pain, chronic pain    Home Situation  Home Environment: Apartment  # Steps to Enter: 15  Rails to Enter: Yes  Hand Rails : Bilateral  One/Two Story Residence:  (lives on second floor of apartment with elevator)  Living Alone: Yes  Support Systems: Child(paris),Other Family Member(s)  Patient Expects to be Discharged to[de-identified]  ([facility] vs SNF  pending progress )  Current DME Used/Available at Home: Shower chair,Grab bars  Tub or Shower Type: Shower    EXAMINATION/PRESENTATION/DECISION MAKING:   Critical Behavior:  Neurologic State: Alert  Orientation Level: Oriented X4  Cognition: Follows commands  Safety/Judgement: Decreased insight into deficits  Hearing:     Skin:    Edema:   Range Of Motion:  AROM: Within functional limits                       Strength:    Strength: Generally decreased, functional                    Tone & Sensation:   Tone: Normal              Sensation: Intact               Coordination:  Coordination: Within functional limits  Vision:      Functional Mobility:  Bed Mobility:     Supine to Sit: Contact guard assistance  Sit to Supine: Contact guard assistance     Transfers:  Sit to Stand: Contact guard assistance  Stand to Sit: Contact guard assistance        Bed to Chair: Contact guard assistance              Balance:   Sitting: Intact  Standing: Intact  Ambulation/Gait Training:  Distance (ft): 100 Feet (ft)  Assistive Device: Gait belt;Brace/Splint  Ambulation - Level of Assistance: Contact guard assistance     Gait Description (WDL): Exceptions to WDL  Gait Abnormalities: Decreased step clearance        Base of Support: Widened     Speed/Treva: Pace decreased (<100 feet/min); Slow  Step Length: Right shortened;Left shortened           Functional Measure:  Barthel Index:    Bathing: 0  Bladder: 10  Bowels: 10  Groomin  Dressin  Feeding: 10  Mobility: 10  Stairs: 0  Toilet Use: 10  Transfer (Bed to Chair and Back): 10  Total: 70/100       The Barthel ADL Index: Guidelines  1. The index should be used as a record of what a patient does, not as a record of what a patient could do. 2. The main aim is to establish degree of independence from any help, physical or verbal, however minor and for whatever reason. 3. The need for supervision renders the patient not independent. 4. A patient's performance should be established using the best available evidence. Asking the patient, friends/relatives and nurses are the usual sources, but direct observation and common sense are also important. However direct testing is not needed. 5. Usually the patient's performance over the preceding 24-48 hours is important, but occasionally longer periods will be relevant. 6. Middle categories imply that the patient supplies over 50 per cent of the effort. 7. Use of aids to be independent is allowed. Score Interpretation (from [facility] 83)    Independent   60-79 Minimally independent   40-59 Partially dependent   20-39 Very dependent   <20 Totally dependent     -Juan Mejia., Barthel, D.W. (1965). Functional evaluation: the Barthel Index. [facility] ([address] [place] (). The Barthel activities of daily living index: self-reporting versus actual performance in the old (> or = 75 years). Journal of [address] [place] [address] 45(7), [address] [facility], .CAL., Mary Grace Guerrero., [facility]. (). Measuring the change in disability after inpatient rehabilitation; comparison of the responsiveness of the Barthel Index and Functional Parmele Measure. Journal of Neurology, Neurosurgery, and Psychiatry, 66(4), 603-885.   Oswald Khan, N.J.A, CHINO Peter, & Neva Mccoy MEvanA. (2004) Assessment of post-stroke quality of life in cost-effectiveness studies: The usefulness of the Barthel Index and the EuroQoL-5D. Quality of Life Research, 15, 938-50            Physical Therapy Evaluation Charge Determination   History Examination Presentation Decision-Making   HIGH Complexity :3+ comorbidities / personal factors will impact the outcome/ POC  MEDIUM Complexity : 3 Standardized tests and measures addressing body structure, function, activity limitation and / or participation in recreation  LOW Complexity : Stable, uncomplicated  Other outcome measures Barthel  LOW       Based on the above components, the patient evaluation is determined to be of the following complexity level: LOW     Pain Ratin/10    Activity Tolerance:   Good    After treatment patient left in no apparent distress:   Supine in bed, Call bell within reach, and Side rails x 3    COMMUNICATION/EDUCATION:   The patients plan of care was discussed with: Registered nurse. Fall prevention education was provided and the patient/caregiver indicated understanding., Patient/family have participated as able in goal setting and plan of care. , and Patient/family agree to work toward stated goals and plan of care.     Thank you for this referral.  Debo Armenta, PT   Time Calculation: 21 mins

## 2022-04-22 NOTE — PROGRESS NOTES
Spine Surgery Progress Note  Carrillo Alvares PA-C     Admit Date: 2022   LOS: 1 day        Daily Progress Note: 2022    POD:1 Day Post-Op    S/P: Procedure(s):  L4,5 LAMINECTOMY DISCECTOMY WITH INSTRUMENTED FUSION POSTERIOR LUMBAR INTERBODY FUSION AND PEDICLE SCREWS (O-ARM)    Subjective:     Ms. Miguel Johns is a 80-year-old female with progressive back and right leg radiculopathy. She had a previous L5-S1 decompression many years ago. Workup revealed dynamic instability at L4-5 with a degenerative slip with high-grade stenosis and right paracentral disk herniation. She underwent L4-5 laminectomy and fusion surgery on 22. She tolerated the procedure well. On POD#1, patient feels well. Her main complaint is right thigh numbness. Pain is well-controlled. Retana was removed and patient is voiding without issue. She denies chest pain, leg pain, nausea, vomiting, difficulty swallowing, headache, and dyspnea. Pt resting comfortably in bed. Objective:     Vital signs  Temp (24hrs), Av °F (36.7 °C), Min:97.2 °F (36.2 °C), Max:98.8 °F (37.1 °C)   No intake/output data recorded.  1901 -  0700  In: Jean Pierre Nolasco [P.O.:25; I.V.:1870]  Out: 3465 [Urine:3075; Drains:240]    Visit Vitals  BP (!) 163/73 (BP 1 Location: Right upper arm, BP Patient Position: At rest)   Pulse (!) 58   Temp 97.6 °F (36.4 °C)   Resp 16   Ht 5' 6\" (1.676 m)   Wt 60.1 kg (132 lb 7.9 oz)   LMP 2003   SpO2 100%   BMI 21.39 kg/m²      O2 Device: None (Room air)     Pain control  Pain Assessment  Pain Scale 1: Numeric (0 - 10)  Pain Intensity 1: 5  Pain Onset 1: Post op  Pain Location 1: Back  Pain Orientation 1: Lower  Pain Description 1: Aching  Pain Intervention(s) 1: Rest    PT/OT  Gait              Physical Exam:  Gen: No acute distress. Neuro: A&Ox3. Follows commands. Speech clear. Affect normal.  MODI spontaneously. Strength 5/5 in UE and LE BL. Sensation intact. Gait deferred. Calves soft and supple;  No pain with passive stretch  Skin: Incision C/D/I  Drain intact: output 90mL overnight    24 hour results:    Recent Results (from the past 24 hour(s))   HEMOGLOBIN    Collection Time: 04/22/22 12:15 AM   Result Value Ref Range    HGB 10.7 (L) 11.5 - 16.0 g/dL        Assessment:     Active Problems:    Lumbar stenosis with neurogenic claudication (4/21/2022)      Plan:     s/p L4-5 laminectomy/fusion  -PT/OT - in lumbar brace    -Pain control - scheduled tylenol, prn hydrocodone   -Monitor drain output; d/c when output <30mL over 8 hours   -Regular diet   -Daily dressing changes to incision   -CM consult for [facility]    Readiness for discharge:     [] Vital Signs stable    [x] Hgb stable    [x] + Voiding    [] Wound intact, drainage minimal    [x] Tolerating PO intake     [] Cleared by PT (OT if applicable)    [x] Adequate pain control on oral medication alone       Activity: up with assist  DVT ppx: SCDs  Dispo: pending PT clearance/drain output; likely over the weekend    Plan d/w Dr. Antoni Dupree, PA

## 2022-04-22 NOTE — PROGRESS NOTES
Problem: Self Care Deficits Care Plan (Adult)  Goal: *Acute Goals and Plan of Care (Insert Text)  Description: FUNCTIONAL STATUS PRIOR TO ADMISSION: Per pt, independent with self-care and mobility, however, reported that she has been unable to sit in chair since January d/t pain    HOME SUPPORT: The patient lived alone with children to provide assistance. Occupational Therapy Goals  Initiated 4/22/2022    1. Patient will perform lower body dressing with modified independence using AE PRN within 4 days. 2.  Patient will perform toileting with independence using most appropriate DME within 4 days. 3.  Patient will bathing at modified independence within 4 days. 4.  Patient will don/doff back brace at independence within 4 days. 5.  Patient will verbalize/demonstrate 3/3 back precautions during ADL tasks without cues within 4 days. Outcome: Progressing Towards Goal   OCCUPATIONAL THERAPY EVALUATION: Spine  Patient: Gilford Ruths (31 y.o. female)  Date: 4/22/2022  Primary Diagnosis: Lumbar stenosis with neurogenic claudication [M48.062]  Procedure(s) (LRB):  L4,5 LAMINECTOMY DISCECTOMY WITH INSTRUMENTED FUSION POSTERIOR LUMBAR INTERBODY FUSION AND PEDICLE SCREWS (O-ARM) (N/A) 1 Day Post-Op   Precautions:     No bending, no lifting greater than 5 lbs, no twisting, log-roll technique, repositioning every 20-30 min except when sleeping, brace when OOB     ASSESSMENT :  Based on the objective data described below, patient presents with Minimum assistance upper body ADLs, Moderate Assistance lower body ADLs, and Contact guard assistance functional mobility. Attempted to review AE for LB dressing, however, pt unable to tolerate sitting in chair for <5 min.     The following are barriers to ADL independence while in acute care:   - Cognitive and/or behavioral:  anxious, increase anxiety  - Medical condition: strength, functional reach, standing balance, pain tolerance, and       - Other:  poor sitting tolerance    Patient will benefit from skilled acute intervention to address the above impairments. Patients rehabilitation potential is considered to be Excellent    Discharge recommendations: Home health (to increase independence and safety)  If above is not an option then recommend: None    Barriers to discharging home, in addition to above listed impairments: lives alone. Equipment recommendations for successful discharge (if) home: none     PLAN :  Recommendations and Planned Interventions: self care training, functional mobility training, balance training, therapeutic activities, endurance activities, patient education, and home safety training    Frequency/Duration: Patient will be followed by occupational therapy 5 times a week to address goals. SUBJECTIVE:   Patient stated I can't sit anymore.     OBJECTIVE DATA SUMMARY:   HISTORY:   Past Medical History:   Diagnosis Date    Back pain     Chronic pain     lower back and into left hip    Phlebitis     DURING  PREGNANCY      Past Surgical History:   Procedure Laterality Date    COLONOSCOPY N/A 8/20/2018    COLONOSCOPY performed by Ivone Coppola MD at [facility] ENDOSCOPY    HX CYST INCISION AND DRAINAGE Right 2000    HX LUMBAR LAMINECTOMY  1995    L5-S1    HX REFRACTIVE SURGERY      HX TONSIL AND ADENOIDECTOMY      HX TUBAL LIGATION  1976    NH REMOVE LUMBAR ARTIFICIAL [address], SINGLE      VASCULAR SURGERY PROCEDURE UNLIST Right     vein surgery       Prior Level of Function/Environment/Context: independent    Expanded or extensive additional review of patient history:     Home Situation  Home Environment: Apartment  # Steps to Enter: 13  Rails to Enter: Yes  Hand Rails : Bilateral  One/Two Story Residence:  (lives on second floor of apartment with elevator)  Living Alone: Yes  Support Systems: Child(paris),Other Family Member(s)  Patient Expects to be Discharged to[de-identified]  ([facility] vs SNF  pending progress )  Current DME Used/Available at Home: [facility] bars  Tub or Shower Type: Shower      EXAMINATION OF PERFORMANCE DEFICITS:  Cognitive/Behavioral Status:  Neurologic State: Alert  Orientation Level: Oriented X4  Cognition: Follows commands        Safety/Judgement: Decreased insight into deficits    Skin: intact    Edema:  none noted    Hearing:       Vision/Perceptual:                                     Range of Motion:    AROM: Within functional limits                         Strength:    Strength: Generally decreased, functional                Coordination:  Coordination: Within functional limits  Fine Motor Skills-Upper: Left Intact; Right Intact    Gross Motor Skills-Upper: Left Intact; Right Intact    Tone & Sensation:    Tone: Normal  Sensation: Intact                      Balance:  Sitting: Intact  Standing: Intact    Functional Mobility and Transfers for ADLs:  Bed Mobility:  Supine to Sit: Contact guard assistance  Sit to Supine: Contact guard assistance    Transfers:  Sit to Stand: Contact guard assistance  Stand to Sit: Contact guard assistance  Bed to Chair: Contact guard assistance  Bathroom Mobility: Contact guard assistance  Toilet Transfer : Contact guard assistance    ADL Assessment:  Feeding: Independent    Oral Facial Hygiene/Grooming: Setup         Upper Body Dressing: Minimum assistance    Lower Body Dressing: Moderate assistance    Toileting: Contact guard assistance                Patient instructed and demonstrated 2/3 cervical spine precautions with verbal cues. Patient instructed and indicated understanding the benefits of maintaining activity tolerance, functional mobility, and independence with self care tasks during acute stay  to ensure safe return home and to baseline.  Encouraged patient to increase frequency and duration OOB, not sitting longer than 30 mins without marching/walking with staff, be out of bed for all meals, perform daily ADLs (as approved by RN/MD regarding bathing etc), and performing functional mobility to/from bathroom. Patient instruction and indicated understanding on body mechanics, ergonomics and gravitational force on the spine during different body positions to plan activities in prep for return home to complete basic ADLs, instrumental ADLs and back to work safely. Dressing brace: Patient instructed and demonstrated to don/doff velcro on brace using dominant side, keeping non-dominant side intact. Patient instructed and demonstrated in meantime of being able to stand with back against wall to don/doff brace, to don/doff seated using lap and bed/chair surface to support brace while manipulating. Dressing lower body: Patient instructed to don brace first and on the benefits to remain seated to don all clothing to increase independence with precautions and pain management. However, unable to tolerate sitting in chair to review AE. Functional Measure:  70/100    Occupational Therapy Evaluation Charge Determination   History Examination Decision-Making   LOW Complexity : Brief history review  LOW Complexity : 1-3 performance deficits relating to physical, cognitive , or psychosocial skils that result in activity limitations and / or participation restrictions  LOW Complexity : No comorbidities that affect functional and no verbal or physical assistance needed to complete eval tasks       Based on the above components, the patient evaluation is determined to be of the following complexity level: LOW   Pain:  Pre treatment: 6 /10   During treatment: 6/10  Post treatment:  6/10       Activity Tolerance:   Fair  Please refer to the flowsheet for vital signs taken during this treatment. After treatment patient left:   Supine in bed  Call light within reach   COMMUNICATION/EDUCATION:   The patients plan of care was discussed with: Physical Therapist and Registered Nurse.     Home safety education was provided and the patient/caregiver indicated understanding., Patient/family have participated as able in goal setting and plan of care. , and Patient/family agree to work toward stated goals and plan of care. This patients plan of care is appropriate for delegation to [facility].     Thank you for this referral.  Marisela Dean, OTR/L  Time Calculation: 29 mins

## 2022-04-22 NOTE — PROGRESS NOTES
Patient assessed for readiness to ambulate. Vital Signs  Level of Consciousness: Alert (0)  Temp: 97.2 °F (36.2 °C)  Temp Source: Oral  Pulse (Heart Rate): (!) 55  Heart Rate Source: Monitor  Resp Rate: 18  BP: (!) 148/69  MAP (Monitor): 88  MAP (Calculated): 95  BP 1 Location: Right upper arm  BP 1 Method: Automatic  BP Patient Position: At rest  MEWS Score: 1. Patient attempted to ambulate with assistance of 2 nurses. Patient did not ambulate. Sat on edge of bed, head felt light still after several minutes and pt did not feel like pushing through it and elected to lay back down.

## 2022-04-22 NOTE — PROGRESS NOTES
Problem: Falls - Risk of  Goal: *Absence of Falls  Description: Document [place] Fall Risk and appropriate interventions in the flowsheet.   Outcome: Progressing Towards Goal  Note: Fall Risk Interventions:  Mobility Interventions: Communicate number of staff needed for ambulation/transfer,Patient to call before getting OOB,Utilize walker, cane, or other assistive device         Medication Interventions: Patient to call before getting OOB    Elimination Interventions: Call light in reach

## 2022-04-22 NOTE — PROGRESS NOTES
Transition of Care Plan  RUR 3 %    Medicare pt has received, reviewed, and signed 1st IM letter informing them of their right to appeal the discharge. Signed copy has been placed on pt bedside chart. Disposition  Pending medical and therapy progress and recommendations  Home-- with [facility]  [address] accepted   Snf  Will require authorization-- choice is [facility] [employer]   TBD-- pending medical and therapy progress and recommendations  BLS vs [address]    Accepted by [facility] follow up  PCP and specialist    Contact  Daughter  Earlene Conner  [phone]   [facility] [phone]    Reason for Admission:    Lumbar Stenosis with Instibility                    RUR Score:      3%               Plan for utilizing home health:     Yes if patient returns home     PCP: First and Last name:  Bernice Driver MD     Name of Practice:    Are you a current patient: Yes/No:    Approximate date of last visit:    Can you participate in a virtual visit with your PCP:                     Current Advanced Directive/Advance Care Plan: No Order      Healthcare Decision Maker:   Click here to complete [address] including selection of the Healthcare Decision Maker Relationship (ie \"Primary\")                  Transition of Care Plan:    Home with home health and medical follow up vs SNF    Cm met with patient in her room to introduce self and explain role. Patient was alert and oriented. . was experiencing pain but confirmed demographics, PCP and insurance-- [employer]. Patient secures medications at [facility]    Patient lives in her one bedroom apartment alone. She has no steps. Patient was self care and independent prior to [facility]-- driving  and handling her own affairs    Patient has her daughter, Ghulam Martinez who lives in the area and is supportive. . She does work and will not be able to stay with patient when discharged. Patient will return home alone.     Patient in agreement with SNF if recommended-- Her choice is [facility]. .    If patient returns home,  [facility] has been ordered-- Patient has no  preference providing the agency accepts her insurance. Referrals sent to   [facility],  [address] [facility],   [place], [address] [facility],  [address] [place] [address]. No         CM met with patient and daughter-- informed of acceptances-  [facility] chose [facility]   Name and number placed on AVS    CM will follow and assist with transition of care planning. . Transportation at discharge pending patient's progress Car vs BLS     Second Medicare letter will be provided prior to discharge. Care Management Interventions  Mode of Transport at Discharge: Other (see comment) (car vs BLS  pending progress)  Transition of Care Consult (CM Consult):  Other  Discharge Durable Medical Equipment: No  Physical Therapy Consult: Yes  Occupational Therapy Consult: Yes  Speech Therapy Consult: No  Support Systems: Child(paris),Other Family Member(s)  The Plan for Transition of Care is Related to the Following Treatment Goals : Home Health/ SNF  The Patient and/or Patient Representative was Provided with a Choice of Provider and Agrees with the Discharge Plan?: Yes  Freedom of Choice List was Provided with Basic Dialogue that Supports the Patient's Individualized Plan of Care/Goals, Treatment Preferences and Shares the Quality Data Associated with the Providers?: Yes  Discharge Location  Patient Expects to be Discharged to[de-identified]  ([facility] vs SNF  pending progress )

## 2022-04-22 NOTE — PROGRESS NOTES
Problem: Mobility Impaired (Adult and Pediatric)  Goal: *Acute Goals and Plan of Care (Insert Text)  Description: FUNCTIONAL STATUS PRIOR TO ADMISSION: Patient was independent and active without use of DME. Pt reports she has not sat since January(stands or lies down). Also reports she hasn't really left her apartment since January. HOME SUPPORT PRIOR TO ADMISSION: The patient lived alone with children to provide assistance. Physical Therapy Goals  Initiated 4/22/2022    1. Patient will move from supine to sit and sit to supine , scoot up and down, and roll side to side in bed with independence within 4 days. 2. Patient will perform sit to stand with independence within 4 days. 3. Patient will ambulate with independence for 250 feet with the least restrictive device within 4 days. 4. Patient will ascend/descend 14 stairs with 1 handrail(s) with supervision/set-up within 4 days. 5. Patient will verbalize and demonstrate understanding of spinal precautions (No bending, lifting greater than 5 lbs, or twisting; log-roll technique; frequent repositioning as instructed) within 4 days. 4/22/2022 1619 by Melanie Menjivar, MADDIE  Outcome: Progressing Towards Goal   PHYSICAL THERAPY TREATMENT  Patient: Hayward Spurling (18 y.o. female)  Date: 4/22/2022  Diagnosis: Lumbar stenosis with neurogenic claudication [M48.062] <principal problem not specified>  Procedure(s) (LRB):  L4,5 LAMINECTOMY DISCECTOMY WITH INSTRUMENTED FUSION POSTERIOR LUMBAR INTERBODY FUSION AND PEDICLE SCREWS (O-ARM) (N/A) 1 Day Post-Op  Precautions:   No bending, no lifting greater than 5 lbs, no twisting, log-roll technique, repositioning every 20-30 min except when sleeping, brace when OOB (if ordered)  Chart, physical therapy assessment, plan of care and goals were reviewed. ASSESSMENT  Patient continues with skilled PT services and is progressing towards goals. Pt remains limited by back pain and decreased adherence to back precautions.  Pt impulsive with mobility and requires frequent cues to avoid bending and twisting. Attempting to make bed, move chair and get out of bed without supervision. Received pt half way to sitting attempting to add pillows to bed d/t bed being too hard. Pt largely SBA for mobility and ambulates 180ft in hallway with intact balance. Reporting increases pain last 60ft in bilateral hips. Tolerates sitting in chair for 6 minutes before requesting back to bed. Will benefit from HHPT at discharge. Current Level of Function Impacting Discharge (mobility/balance): SBA for mobility    Other factors to consider for discharge: pain control, can not sit >6 minutes at this time         PLAN :  Patient continues to benefit from skilled intervention to address the above impairments. Continue treatment per established plan of care. to address goals. Recommendation for discharge: (in order for the patient to meet his/her long term goals)  Physical therapy at least 2 days/week in the home     This discharge recommendation:  Has been made in collaboration with the attending provider and/or case management    IF patient discharges home will need the following DME: patient owns DME required for discharge       SUBJECTIVE:   Patient stated I have had pain in my back since my fall after the first surgery.     OBJECTIVE DATA SUMMARY:   Critical Behavior:  Neurologic State: Alert  Orientation Level: Oriented X4  Cognition: Follows commands  Safety/Judgement: Decreased insight into deficits    Spinal diagnosis intervention:  The patient stated 3/3 back precautions when prompted. Reviewed all 3 back precautions, log roll technique, and sitting for 30 minutes at a time. The patient required verbal cues to maintain back precautions during functional activity. Reviewed back brace application and wear schedule.  Brace donned with minimal assistance/contact guard assist in sitting    Functional Mobility Training:    Bed Mobility:  Log    Supine to Sit: Stand-by assistance  Sit to Supine: Stand-by assistance           Transfers:  Sit to Stand: Stand-by assistance  Stand to Sit: Stand-by assistance        Bed to Chair: Stand-by assistance                    Balance:  Sitting: Intact  Standing: Intact  Ambulation/Gait Training:  Distance (ft): 180 Feet (ft)  Assistive Device: Gait belt;Brace/Splint  Ambulation - Level of Assistance: Stand-by assistance     Gait Description (WDL): Exceptions to WDL  Gait Abnormalities: Decreased step clearance        Base of Support: Narrowed     Speed/Treva: Pace decreased (<100 feet/min)  Step Length: Right shortened;Left shortened                    Stairs: Therapeutic Exercises:     Pain Rating:      Activity Tolerance:   Good    After treatment patient left in no apparent distress:   Supine in bed, Call bell within reach, and Side rails x 3    COMMUNICATION/COLLABORATION:   The patients plan of care was discussed with: Registered nurse.      Debo Armenta, PT   Time Calculation: 33 mins

## 2022-04-22 NOTE — DISCHARGE INSTRUCTIONS
After Hospital Care Plan:  Discharge Instructions Lumbar Fusion Surgery   Dr. Yehuda Milligan     Patient Name: Milan Meza    Date of procedure: 4/21/2022      Procedure: Procedure(s):  L4,5 LAMINECTOMY DISCECTOMY WITH INSTRUMENTED FUSION POSTERIOR LUMBAR INTERBODY FUSION AND PEDICLE SCREWS (O-ARM)  PCP: Kristy Santiago MD    Follow up appointments  -follow up with Dr. Yehuda Milligan in 2 weeks. Call [phone] to make an appointment as soon as you get home from the hospital.    When to call your Spine Surgeon:  -Signs of infection-if your incision is red; continues to have drainage; drainage has a foul odor or if you have a persistent fever over 101 degrees for 24 hours  -Nausea or vomiting, severe headache  -Loss of bowel or bladder function, inability to urinate  -Changes in sensation in your arms or legs (numbness, tingling, loss of color)  -Increased weakness-greater than before your surgery  -Severe pain or pain not relieved by medications  -Signs of a blood clot in your leg-calf pain, tenderness, redness, swelling of lower leg    When to call your Primary Care Physician:  -Concerns about medical conditions such as diabetes, high blood pressure, asthma, congestive heart failure  -Call if blood sugars are elevated, persistent headache or dizziness, coughing or congestion, constipation or diarrhea, burning with urination, abnormal heart rate    When to call 911 and go to the nearest emergency room:  -Acute onset of chest pain, shortness of breath, difficulty breathing    Activity  -You are going home a well person, be as active as possible. Your only exercise should be walking. Start with short frequent walks and increase your walking distance each day.  -Limit the amount of time you sit to 20-30 minute intervals.   Sitting for prolonged periods of time will be uncomfortable for you following surgery.  -Do NOT lift anything over 5 pounds  -Do NOT do any straining, twisting or bending  -When you are in bed, you may lay on your back or on either side. Do NOT lie on your stomach    Brace  -If you have a back brace, you should wear your brace at all times when you are out of bed. Do not wear the brace while in bed or showering.  -Remember to always wear a cotton t-shirt underneath your brace.  -Do not bend or twist when your brace is off. Diet  -Resume usual diet; drink plenty of fluids; eat foods high in fiber  -It is important to have regular bowel movements. Pain medications may cause constipation. You may want to take a stool softener (such as Senokot-S or Colace) to prevent constipation.   -If constipation occurs, take a laxative (such as Dulcolax tablets, Milk of Magnesia, or a suppository). Laxatives should only be used if the above preventable measures have failed and you still have not had a bowel movement after three days    Driving  -You may not drive or return to work until instructed by your physician. However, you may ride in the car for short periods of time. Incision Care  -You may take brief showers but do not run the water run directly onto the wound. After showering or bathing, remove the wet dressing and gently blot the wound dry with a soft towel.  -Do not rub or apply any lotions or ointments to your incision site.   -Do not soak or scrub your wound  -Keep a dry dressing (guaze and paper tape) on your incision and have it changed daily for 14 days after surgery; more often if your incision is draining. Have your caregiver wash their hands thoroughly before changing your dressing.  -You will have absorbable sutures and skin glue on your incision. Skin glue will fall off on its own; do not pick at your incision. Showering  -You may shower in approximately 4 days after your surgery.    -Leave the dressing on during your shower. Do NOT allow the water to run directly onto your dressing.    Once you get out of the shower, pat the area dry with a towel and put on a dry dressing.  -Reminder- your brace can be removed while showering. Remember to not bend or twist while your brace is off.    -Do not take a tub bath. Preventing blood clots  -You have been given T.E.D. stockings to wear. Continue to wear these for 7 days after your discharge. Put them on in the morning and take them off at night.    -They are used to increase your circulation and prevent blood clots from forming in your legs  -T. E.D. stockings can be machine washed, temperature not to exceed 160° F (71°C) and machine dried for 15 to 20 minutes, temperature not to exceed 250° F (121°C). Pain management  -Take pain medication as prescribed; decrease the amount you use as your pain lessens  -DO not wait until you are in extreme pain to take your medication.  -Avoid alcoholic beverages while taking pain medication    Pain Medication Safety  DO:  -Read the Medication Guide   -Take your medicine exactly as prescribed   -Store your medicine away from children and in a safe place   -Call your healthcare provider for medical advice about side effects.  -Please be aware that many medications contain Tylenol. We do not want you to over medicate so please read the information below as a guide. Do not take more than 4 Grams of Tylenol in a 24 hour period. (There are 1000 milligrams in one Gram)                                                                                                                                                                                                                                                                                                                                                                  Percocet contains 325 mg of Tylenol per tablet (do not take more than 12 tablets in 24 hours)  Lortab contains 500 mg of Tylenol per tablet (do not take more than 8 tablets in 24 hours)  Norco contains 325 mg of Tylenol per tablet (do not take more than 12 tablets in 24 hours).   DO NOT:  -Do not give your medicine to others   -Do not take medicine unless it was prescribed for you   -Do not stop taking your medicine without talking to your healthcare provider   -Do not break, chew, crush, dissolve, or inject your medicine. If you cannot swallow your medicine whole, talk to your healthcare provider.  -Do not drink alcohol while taking this medicine  -Do not take anti-inflammatory medications or aspirin unless instructed by your physician.

## 2022-04-23 LAB — HGB BLD-MCNC: 11.6 G/DL (ref 11.5–16)

## 2022-04-23 PROCEDURE — 85018 HEMOGLOBIN: CPT

## 2022-04-23 PROCEDURE — 74011250637 HC RX REV CODE- 250/637: Performed by: PHYSICIAN ASSISTANT

## 2022-04-23 PROCEDURE — 74011250637 HC RX REV CODE- 250/637: Performed by: NEUROLOGICAL SURGERY

## 2022-04-23 PROCEDURE — 97116 GAIT TRAINING THERAPY: CPT

## 2022-04-23 PROCEDURE — 65390000012 HC CONDITION CODE 44 OBSERVATION

## 2022-04-23 PROCEDURE — 65270000029 HC RM PRIVATE

## 2022-04-23 PROCEDURE — 36415 COLL VENOUS BLD VENIPUNCTURE: CPT

## 2022-04-23 PROCEDURE — 94760 N-INVAS EAR/PLS OXIMETRY 1: CPT

## 2022-04-23 PROCEDURE — 74011000250 HC RX REV CODE- 250: Performed by: NEUROLOGICAL SURGERY

## 2022-04-23 RX ADMIN — HYDROCODONE BITARTRATE AND ACETAMINOPHEN 1 TABLET: 5; 325 TABLET ORAL at 07:56

## 2022-04-23 RX ADMIN — HYDROCODONE BITARTRATE AND ACETAMINOPHEN 1 TABLET: 5; 325 TABLET ORAL at 03:18

## 2022-04-23 RX ADMIN — ACETAMINOPHEN 325 MG: 325 TABLET ORAL at 18:07

## 2022-04-23 RX ADMIN — HYDROCODONE BITARTRATE AND ACETAMINOPHEN 1 TABLET: 5; 325 TABLET ORAL at 12:11

## 2022-04-23 RX ADMIN — DOCUSATE SODIUM 100 MG: 100 CAPSULE, LIQUID FILLED ORAL at 18:07

## 2022-04-23 RX ADMIN — SODIUM CHLORIDE, PRESERVATIVE FREE 10 ML: 5 INJECTION INTRAVENOUS at 13:49

## 2022-04-23 RX ADMIN — DOCUSATE SODIUM 100 MG: 100 CAPSULE, LIQUID FILLED ORAL at 08:01

## 2022-04-23 RX ADMIN — ACETAMINOPHEN 325 MG: 325 TABLET ORAL at 00:47

## 2022-04-23 RX ADMIN — HYDROCODONE BITARTRATE AND ACETAMINOPHEN 1 TABLET: 5; 325 TABLET ORAL at 19:06

## 2022-04-23 NOTE — PROGRESS NOTES
Problem: Falls - Risk of  Goal: *Absence of Falls  Description: Document Juan José Welch Fall Risk and appropriate interventions in the flowsheet.   Outcome: Progressing Towards Goal  Note: Fall Risk Interventions:  Mobility Interventions: Communicate number of staff needed for ambulation/transfer,Patient to call before getting OOB,Utilize walker, cane, or other assistive device         Medication Interventions: Patient to call before getting OOB    Elimination Interventions: Call light in reach              Problem: Simple Spine Procedure:  Day of Surgery  Goal: Nutrition/Diet  Outcome: Progressing Towards Goal

## 2022-04-23 NOTE — PROGRESS NOTES
Neurosurgery    Slowly improving   Motor 5/5 LE  Wound clean and dry  Walked around room this am, 180 ft in hallway with PT    Will d/c drain  See how she does tomorrow for possibility of going home

## 2022-04-23 NOTE — PROGRESS NOTES
Problem: Mobility Impaired (Adult and Pediatric)  Goal: *Acute Goals and Plan of Care (Insert Text)  Description: FUNCTIONAL STATUS PRIOR TO ADMISSION: Patient was independent and active without use of DME. Pt reports she has not sat since January(stands or lies down). Also reports she hasn't really left her apartment since January. HOME SUPPORT PRIOR TO ADMISSION: The patient lived alone with children to provide assistance. Physical Therapy Goals  Initiated 4/22/2022    1. Patient will move from supine to sit and sit to supine , scoot up and down, and roll side to side in bed with independence within 4 days. 2. Patient will perform sit to stand with independence within 4 days. 3. Patient will ambulate with independence for 250 feet with the least restrictive device within 4 days. 4. Patient will ascend/descend 14 stairs with 1 handrail(s) with supervision/set-up within 4 days. 5. Patient will verbalize and demonstrate understanding of spinal precautions (No bending, lifting greater than 5 lbs, or twisting; log-roll technique; frequent repositioning as instructed) within 4 days. Outcome: Progressing Towards Goal     PHYSICAL THERAPY TREATMENT  Patient: Dudley Reardon (8 y.o. female)  Date: 4/23/2022  Diagnosis: Lumbar stenosis with neurogenic claudication [M48.062] <principal problem not specified>  Procedure(s) (LRB):  L4,5 LAMINECTOMY DISCECTOMY WITH INSTRUMENTED FUSION POSTERIOR LUMBAR INTERBODY FUSION AND PEDICLE SCREWS (O-ARM) (N/A) 2 Days Post-Op  Precautions:   No bending, no lifting greater than 5 lbs, no twisting, log-roll technique, repositioning every 20-30 min except when sleeping, brace when OOB (if ordered)  Chart, physical therapy assessment, plan of care and goals were reviewed. ASSESSMENT  Patient continues with skilled PT services and is progressing towards goals. Pt is mobilizing well. Pt is adhering to back precautions. Pt has elevator to access apartment.  Pt is cleared from PT standpoint. Current Level of Function Impacting Discharge (mobility/balance): supervision     Other factors to consider for discharge: back precautions          PLAN :  Patient continues to benefit from skilled intervention to address the above impairments. Continue treatment per established plan of care. to address goals. Recommendation for discharge: (in order for the patient to meet his/her long term goals)  Physical therapy at least 2 days/week in the home     This discharge recommendation:  Has not yet been discussed the attending provider and/or case management    IF patient discharges home will need the following DME: none       SUBJECTIVE:   Patient stated Candice Parikh got up earlier and washed up .     OBJECTIVE DATA SUMMARY:   Critical Behavior:  Neurologic State: Alert  Orientation Level: Oriented X4  Cognition: Follows commands  Safety/Judgement: Decreased insight into deficits    Spinal diagnosis intervention:  The patient stated 3/3 back precautions when prompted. Reviewed all 3 back precautions, log roll technique, and sitting for 30 minutes at a time. The patient required no cues to maintain back precautions during functional activity. Reviewed back brace application and wear schedule. Brace donned with modified independence      Functional Mobility Training:    Bed Mobility:  Log    Supine to Sit: Modified independent              Transfers:  Sit to Stand: Modified independent  Stand to Sit: Modified independent                             Balance:  Sitting: Intact  Standing: Intact  Ambulation/Gait Training:  Distance (ft): 200 Feet (ft)  Assistive Device: Brace/Splint  Ambulation - Level of Assistance: Supervision        Gait Abnormalities: Decreased step clearance              Speed/Treva: Pace decreased (<100 feet/min)  Step Length: Left shortened;Right shortened             Stairs:               Therapeutic Exercises:     Pain Rating:  Did not limit     Activity Tolerance:   Improved     After treatment patient left in no apparent distress:   Sitting in chair and Call bell within reach    COMMUNICATION/COLLABORATION:   The patients plan of care was discussed with: Registered nurse.      Yolanda Porras PTA   Time Calculation: 11 mins

## 2022-04-24 VITALS
DIASTOLIC BLOOD PRESSURE: 71 MMHG | OXYGEN SATURATION: 99 % | BODY MASS INDEX: 21.29 KG/M2 | SYSTOLIC BLOOD PRESSURE: 139 MMHG | HEART RATE: 69 BPM | TEMPERATURE: 97.8 F | HEIGHT: 66 IN | WEIGHT: 132.5 LBS | RESPIRATION RATE: 18 BRPM

## 2022-04-24 PROCEDURE — 74011250637 HC RX REV CODE- 250/637: Performed by: NEUROLOGICAL SURGERY

## 2022-04-24 PROCEDURE — 74011250637 HC RX REV CODE- 250/637: Performed by: PHYSICIAN ASSISTANT

## 2022-04-24 PROCEDURE — G0378 HOSPITAL OBSERVATION PER HR: HCPCS

## 2022-04-24 PROCEDURE — 94760 N-INVAS EAR/PLS OXIMETRY 1: CPT

## 2022-04-24 RX ADMIN — ACETAMINOPHEN 325 MG: 325 TABLET ORAL at 09:33

## 2022-04-24 RX ADMIN — ACETAMINOPHEN 325 MG: 325 TABLET ORAL at 03:00

## 2022-04-24 RX ADMIN — DOCUSATE SODIUM 100 MG: 100 CAPSULE, LIQUID FILLED ORAL at 09:33

## 2022-04-24 RX ADMIN — HYDROCODONE BITARTRATE AND ACETAMINOPHEN 1 TABLET: 5; 325 TABLET ORAL at 02:30

## 2022-04-24 RX ADMIN — HYDROCODONE BITARTRATE AND ACETAMINOPHEN 1 TABLET: 5; 325 TABLET ORAL at 07:10

## 2022-04-24 RX ADMIN — HYDROCODONE BITARTRATE AND ACETAMINOPHEN 1 TABLET: 10; 325 TABLET ORAL at 12:03

## 2022-04-24 NOTE — PROGRESS NOTES
Transition of Care Plan   RUR- 3% Low Risk   DISPOSITION: The disposition plan is home with family assistance.  Home with home health - HHPT/OT - [facility]  [phone]- Patient accepted.  F/U with PCP/Specialist     Transport: Family    Patient is discharging at this time. [facility]  [phone] - accepted patient for HHPT/OT. AVS has been updated. Virtual reviewer communicated change to CM, which reflect outpatient observation order written prior to Written discharge order. Code 44 delivered and given to patient. CM met with the patient and provided to the patient the printed information about her outpatient observation status. The patient was given the flyer entitled, \"Medicare Outpatient Observation: Information & notification. \" All questions were answered, no additional discharge needs identified at this time and patient expects to return to their (home, assisted living facility, relatives home, etc.) after discharge today.   Copy provided to patient or sent secure email, secure fax , or certified mail to patient's loved one per their request.      Jelena Fitzgerald, PATSY, CRM, LMHP-e  Available in [employer]

## 2022-04-24 NOTE — PROGRESS NOTES
Problem: Falls - Risk of  Goal: *Absence of Falls  Description: Document Shannon Mcburney Fall Risk and appropriate interventions in the flowsheet.   Outcome: Progressing Towards Goal  Note: Fall Risk Interventions:  Mobility Interventions: Communicate number of staff needed for ambulation/transfer,Patient to call before getting OOB,Utilize walker, cane, or other assistive device         Medication Interventions: Patient to call before getting OOB    Elimination Interventions: Call light in reach              Problem: Simple Spine Procedure:  Day of Surgery  Goal: Off Pathway (Use only if patient is Off Pathway)  Outcome: Progressing Towards Goal

## 2022-04-25 ENCOUNTER — HOME CARE VISIT (OUTPATIENT)
Dept: HOME HEALTH SERVICES | Facility: HOME HEALTH | Age: 72
End: 2022-04-25

## 2022-04-25 NOTE — ADT AUTH CERT NOTES
Comments  Comment            Patient Demographics    Patient Name   Adilson Abraham   [id number] Legal Sex   Female    1950 Address   [address] [place] [address] Phone   [phone] (Home)   [phone] (Mobile) *Preferred*     All ADT Orders (ADT stands for Admission Discharge Transfer)  Collapse  Hide  (From admission, onward)        22 1100  INITIAL PHYSICIAN ORDER: OBSERVATION/OUTPATIENT IN A BED OBSERVATION; Orthopedics; lumbar stenosis with neurogenic claudication  (ADMISSION ORDERS)  ONE TIME        Authorizing Provider: Erik Castillo MD    User who entered the order: William Lang RN       Question Answer Comment   Patient Class: OBSERVATION    Type of Bed Orthopedics    Reason for Observation lumbar stenosis with neurogenic claudication    Admitting Diagnosis Lumbar stenosis with neurogenic claudication    Admitting Physician Carol Lynne    Attending Physician Jacquelyn LAROSE        22 1050   22 1400  INITIAL PHYSICIAN ORDER: 2425 Arash Merrill; 2. Patient admitted for Inpatient Only Procedure (Surgical) Eileen Canchola)  ONE TIME        Authorizing Provider: Dana Rosario MD    User who entered the order: Dana Rosario MD       References:    CLICK HERE-** FAQ-NEW CMS RULES FOR INPATIENT CERTIFICATION **   Question Answer Comment   Status: INPATIENT    Type of Bed Orthopedics    Inpatient Hospitalization Certified Necessary for the Following Reasons 2.  Patient admitted for Inpatient Only Procedure (Surgical)    Admitting Diagnosis Lumbar stenosis with neurogenic claudication    Admitting Physician Carol Lynne    Attending Physician Carol Lynne    Estimated Length of Stay 3-4 Midnights    Discharge Plan: Home with Office Follow-up        22 1350

## 2022-04-26 ENCOUNTER — HOME CARE VISIT (OUTPATIENT)
Dept: SCHEDULING | Facility: HOME HEALTH | Age: 72
End: 2022-04-26
Payer: MEDICARE

## 2022-04-26 PROCEDURE — 3331090002 HH PPS REVENUE DEBIT

## 2022-04-26 PROCEDURE — G0151 HHCP-SERV OF PT,EA 15 MIN: HCPCS

## 2022-04-26 PROCEDURE — 400018 HH-NO PAY CLAIM PROCEDURE

## 2022-04-26 PROCEDURE — 400013 HH SOC

## 2022-04-26 PROCEDURE — 3331090001 HH PPS REVENUE CREDIT

## 2022-04-27 ENCOUNTER — HOME CARE VISIT (OUTPATIENT)
Dept: HOME HEALTH SERVICES | Facility: HOME HEALTH | Age: 72
End: 2022-04-27
Payer: MEDICARE

## 2022-04-27 PROCEDURE — 3331090001 HH PPS REVENUE CREDIT

## 2022-04-27 PROCEDURE — 3331090002 HH PPS REVENUE DEBIT

## 2022-04-28 ENCOUNTER — HOME CARE VISIT (OUTPATIENT)
Dept: SCHEDULING | Facility: HOME HEALTH | Age: 72
End: 2022-04-28
Payer: MEDICARE

## 2022-04-28 VITALS
HEART RATE: 74 BPM | DIASTOLIC BLOOD PRESSURE: 64 MMHG | RESPIRATION RATE: 16 BRPM | OXYGEN SATURATION: 99 % | SYSTOLIC BLOOD PRESSURE: 102 MMHG | TEMPERATURE: 98.6 F

## 2022-04-28 PROCEDURE — 3331090001 HH PPS REVENUE CREDIT

## 2022-04-28 PROCEDURE — G0157 HHC PT ASSISTANT EA 15: HCPCS

## 2022-04-28 PROCEDURE — 3331090002 HH PPS REVENUE DEBIT

## 2022-04-29 ENCOUNTER — HOME CARE VISIT (OUTPATIENT)
Dept: SCHEDULING | Facility: HOME HEALTH | Age: 72
End: 2022-04-29
Payer: MEDICARE

## 2022-04-29 PROCEDURE — 3331090002 HH PPS REVENUE DEBIT

## 2022-04-29 PROCEDURE — G0157 HHC PT ASSISTANT EA 15: HCPCS

## 2022-04-29 PROCEDURE — 3331090001 HH PPS REVENUE CREDIT

## 2022-04-30 PROCEDURE — 3331090001 HH PPS REVENUE CREDIT

## 2022-04-30 PROCEDURE — 3331090002 HH PPS REVENUE DEBIT

## 2022-05-01 VITALS
DIASTOLIC BLOOD PRESSURE: 64 MMHG | HEART RATE: 75 BPM | TEMPERATURE: 99 F | OXYGEN SATURATION: 99 % | SYSTOLIC BLOOD PRESSURE: 122 MMHG | RESPIRATION RATE: 17 BRPM

## 2022-05-01 PROCEDURE — 3331090002 HH PPS REVENUE DEBIT

## 2022-05-01 PROCEDURE — 3331090001 HH PPS REVENUE CREDIT

## 2022-05-02 ENCOUNTER — HOME CARE VISIT (OUTPATIENT)
Dept: SCHEDULING | Facility: HOME HEALTH | Age: 72
End: 2022-05-02
Payer: MEDICARE

## 2022-05-02 PROCEDURE — 3331090002 HH PPS REVENUE DEBIT

## 2022-05-02 PROCEDURE — G0157 HHC PT ASSISTANT EA 15: HCPCS

## 2022-05-02 PROCEDURE — 3331090001 HH PPS REVENUE CREDIT

## 2022-05-03 VITALS
OXYGEN SATURATION: 98 % | TEMPERATURE: 98.5 F | RESPIRATION RATE: 17 BRPM | SYSTOLIC BLOOD PRESSURE: 100 MMHG | HEART RATE: 62 BPM | DIASTOLIC BLOOD PRESSURE: 62 MMHG

## 2022-05-03 PROCEDURE — 3331090002 HH PPS REVENUE DEBIT

## 2022-05-03 PROCEDURE — 3331090001 HH PPS REVENUE CREDIT

## 2022-05-04 ENCOUNTER — HOME CARE VISIT (OUTPATIENT)
Dept: SCHEDULING | Facility: HOME HEALTH | Age: 72
End: 2022-05-04
Payer: MEDICARE

## 2022-05-04 PROCEDURE — 3331090001 HH PPS REVENUE CREDIT

## 2022-05-04 PROCEDURE — G0157 HHC PT ASSISTANT EA 15: HCPCS

## 2022-05-04 PROCEDURE — 3331090002 HH PPS REVENUE DEBIT

## 2022-05-05 PROCEDURE — 3331090002 HH PPS REVENUE DEBIT

## 2022-05-05 PROCEDURE — 3331090001 HH PPS REVENUE CREDIT

## 2022-05-06 ENCOUNTER — HOME CARE VISIT (OUTPATIENT)
Dept: HOME HEALTH SERVICES | Facility: HOME HEALTH | Age: 72
End: 2022-05-06
Payer: MEDICARE

## 2022-05-06 VITALS
RESPIRATION RATE: 16 BRPM | HEART RATE: 70 BPM | SYSTOLIC BLOOD PRESSURE: 100 MMHG | TEMPERATURE: 98.5 F | DIASTOLIC BLOOD PRESSURE: 58 MMHG | OXYGEN SATURATION: 97 %

## 2022-05-06 PROCEDURE — 3331090001 HH PPS REVENUE CREDIT

## 2022-05-06 PROCEDURE — 3331090002 HH PPS REVENUE DEBIT

## 2022-05-07 PROCEDURE — 3331090002 HH PPS REVENUE DEBIT

## 2022-05-07 PROCEDURE — 3331090001 HH PPS REVENUE CREDIT

## 2022-05-08 PROCEDURE — 3331090002 HH PPS REVENUE DEBIT

## 2022-05-08 PROCEDURE — 3331090001 HH PPS REVENUE CREDIT

## 2022-05-09 ENCOUNTER — HOME CARE VISIT (OUTPATIENT)
Dept: SCHEDULING | Facility: HOME HEALTH | Age: 72
End: 2022-05-09
Payer: MEDICARE

## 2022-05-09 PROCEDURE — 3331090002 HH PPS REVENUE DEBIT

## 2022-05-09 PROCEDURE — 3331090001 HH PPS REVENUE CREDIT

## 2022-05-09 PROCEDURE — G0151 HHCP-SERV OF PT,EA 15 MIN: HCPCS

## 2022-05-10 VITALS
SYSTOLIC BLOOD PRESSURE: 104 MMHG | OXYGEN SATURATION: 99 % | HEART RATE: 74 BPM | WEIGHT: 128 LBS | DIASTOLIC BLOOD PRESSURE: 64 MMHG | RESPIRATION RATE: 24 BRPM | BODY MASS INDEX: 20.66 KG/M2 | TEMPERATURE: 98.2 F

## 2022-05-10 PROCEDURE — 3331090002 HH PPS REVENUE DEBIT

## 2022-05-10 PROCEDURE — 3331090001 HH PPS REVENUE CREDIT

## 2022-05-11 PROCEDURE — 3331090002 HH PPS REVENUE DEBIT

## 2022-05-11 PROCEDURE — 3331090001 HH PPS REVENUE CREDIT

## 2022-05-12 PROCEDURE — 3331090001 HH PPS REVENUE CREDIT

## 2022-05-12 PROCEDURE — 3331090002 HH PPS REVENUE DEBIT

## 2022-05-13 PROCEDURE — 3331090001 HH PPS REVENUE CREDIT

## 2022-05-13 PROCEDURE — 3331090002 HH PPS REVENUE DEBIT

## 2022-05-14 PROCEDURE — 3331090002 HH PPS REVENUE DEBIT

## 2022-05-14 PROCEDURE — 3331090001 HH PPS REVENUE CREDIT

## 2022-05-15 PROCEDURE — 3331090002 HH PPS REVENUE DEBIT

## 2022-05-15 PROCEDURE — 3331090001 HH PPS REVENUE CREDIT

## 2022-05-16 VITALS
TEMPERATURE: 97.1 F | RESPIRATION RATE: 20 BRPM | OXYGEN SATURATION: 98 % | DIASTOLIC BLOOD PRESSURE: 62 MMHG | HEART RATE: 72 BPM | SYSTOLIC BLOOD PRESSURE: 118 MMHG

## 2025-03-01 ENCOUNTER — HOSPITAL ENCOUNTER (EMERGENCY)
Facility: HOSPITAL | Age: 75
Discharge: HOME | End: 2025-03-01
Attending: EMERGENCY MEDICINE
Payer: MEDICARE

## 2025-03-01 ENCOUNTER — APPOINTMENT (OUTPATIENT)
Facility: HOSPITAL | Age: 75
End: 2025-03-01
Payer: MEDICARE

## 2025-03-01 VITALS
WEIGHT: 111.5 LBS | BODY MASS INDEX: 17.92 KG/M2 | RESPIRATION RATE: 20 BRPM | HEART RATE: 57 BPM | DIASTOLIC BLOOD PRESSURE: 75 MMHG | HEIGHT: 66 IN | OXYGEN SATURATION: 100 % | TEMPERATURE: 98.3 F | SYSTOLIC BLOOD PRESSURE: 161 MMHG

## 2025-03-01 DIAGNOSIS — R79.89 ELEVATED BRAIN NATRIURETIC PEPTIDE (BNP) LEVEL: Primary | ICD-10-CM

## 2025-03-01 LAB
ALBUMIN SERPL-MCNC: 4.3 G/DL (ref 3.5–5.2)
ALBUMIN/GLOB SERPL: 1.5 (ref 1.1–2.2)
ALP SERPL-CCNC: 84 U/L (ref 35–104)
ALT SERPL-CCNC: 29 U/L (ref 10–35)
ANION GAP SERPL CALC-SCNC: 12 MMOL/L (ref 2–12)
AST SERPL-CCNC: 39 U/L (ref 10–35)
BASOPHILS # BLD: 0.05 K/UL (ref 0–0.1)
BASOPHILS NFR BLD: 1 % (ref 0–1)
BILIRUB SERPL-MCNC: 0.3 MG/DL (ref 0.2–1)
BUN SERPL-MCNC: 26 MG/DL (ref 8–23)
BUN/CREAT SERPL: 40 (ref 12–20)
CALCIUM SERPL-MCNC: 9.6 MG/DL (ref 8.8–10.2)
CHLORIDE SERPL-SCNC: 103 MMOL/L (ref 98–107)
CO2 SERPL-SCNC: 27 MMOL/L (ref 22–29)
CREAT SERPL-MCNC: 0.65 MG/DL (ref 0.5–0.9)
DIFFERENTIAL METHOD BLD: ABNORMAL
EOSINOPHIL # BLD: 0.13 K/UL (ref 0–0.4)
EOSINOPHIL NFR BLD: 2.6 % (ref 0–7)
ERYTHROCYTE [DISTWIDTH] IN BLOOD BY AUTOMATED COUNT: 13.1 % (ref 11.5–14.5)
GLOBULIN SER CALC-MCNC: 2.8 G/DL (ref 2–4)
GLUCOSE SERPL-MCNC: 91 MG/DL (ref 65–100)
HCT VFR BLD AUTO: 38.2 % (ref 35–47)
HGB BLD-MCNC: 12.4 G/DL (ref 11.5–16)
IMM GRANULOCYTES # BLD AUTO: 0.05 K/UL (ref 0–0.04)
IMM GRANULOCYTES NFR BLD AUTO: 1 % (ref 0–0.5)
LYMPHOCYTES # BLD: 1.46 K/UL (ref 0.8–3.5)
LYMPHOCYTES NFR BLD: 29 % (ref 12–49)
MCH RBC QN AUTO: 29.9 PG (ref 26–34)
MCHC RBC AUTO-ENTMCNC: 32.5 G/DL (ref 30–36.5)
MCV RBC AUTO: 92 FL (ref 80–99)
MONOCYTES # BLD: 0.51 K/UL (ref 0–1)
MONOCYTES NFR BLD: 10.1 % (ref 5–13)
NEUTS SEG # BLD: 2.83 K/UL (ref 1.8–8)
NEUTS SEG NFR BLD: 56.3 % (ref 32–75)
NRBC # BLD: 0 K/UL (ref 0–0.01)
NRBC BLD-RTO: 0 PER 100 WBC
NT PRO BNP: 461 PG/ML
PLATELET # BLD AUTO: 218 K/UL (ref 150–400)
PMV BLD AUTO: 10.6 FL (ref 8.9–12.9)
POTASSIUM SERPL-SCNC: 4.3 MMOL/L (ref 3.5–5.1)
PROT SERPL-MCNC: 7.1 G/DL (ref 6.4–8.3)
RBC # BLD AUTO: 4.15 M/UL (ref 3.8–5.2)
SODIUM SERPL-SCNC: 142 MMOL/L (ref 136–145)
TROPONIN T SERPL HS-MCNC: 10.1 NG/L (ref 0–14)
TSH SERPL DL<=0.05 MIU/L-ACNC: 2.62 UIU/ML (ref 0.27–4.2)
WBC # BLD AUTO: 5 K/UL (ref 3.6–11)

## 2025-03-01 PROCEDURE — 99285 EMERGENCY DEPT VISIT HI MDM: CPT

## 2025-03-01 PROCEDURE — 84443 ASSAY THYROID STIM HORMONE: CPT

## 2025-03-01 PROCEDURE — 83880 ASSAY OF NATRIURETIC PEPTIDE: CPT

## 2025-03-01 PROCEDURE — 85025 COMPLETE CBC W/AUTO DIFF WBC: CPT

## 2025-03-01 PROCEDURE — 84484 ASSAY OF TROPONIN QUANT: CPT

## 2025-03-01 PROCEDURE — 71046 X-RAY EXAM CHEST 2 VIEWS: CPT

## 2025-03-01 PROCEDURE — 80053 COMPREHEN METABOLIC PANEL: CPT

## 2025-03-01 PROCEDURE — 36415 COLL VENOUS BLD VENIPUNCTURE: CPT

## 2025-03-01 PROCEDURE — 93005 ELECTROCARDIOGRAM TRACING: CPT | Performed by: EMERGENCY MEDICINE

## 2025-03-01 RX ORDER — FUROSEMIDE 20 MG/1
20 TABLET ORAL DAILY
Qty: 7 TABLET | Refills: 0 | Status: SHIPPED | OUTPATIENT
Start: 2025-03-01 | End: 2025-03-08

## 2025-03-01 ASSESSMENT — PAIN - FUNCTIONAL ASSESSMENT: PAIN_FUNCTIONAL_ASSESSMENT: 0-10

## 2025-03-01 ASSESSMENT — PAIN DESCRIPTION - DESCRIPTORS: DESCRIPTORS: PRESSURE

## 2025-03-01 ASSESSMENT — PAIN DESCRIPTION - ORIENTATION: ORIENTATION: MID

## 2025-03-01 ASSESSMENT — PAIN DESCRIPTION - LOCATION: LOCATION: CHEST

## 2025-03-01 ASSESSMENT — LIFESTYLE VARIABLES
HOW MANY STANDARD DRINKS CONTAINING ALCOHOL DO YOU HAVE ON A TYPICAL DAY: PATIENT DOES NOT DRINK
HOW OFTEN DO YOU HAVE A DRINK CONTAINING ALCOHOL: NEVER

## 2025-03-01 ASSESSMENT — PAIN SCALES - GENERAL: PAINLEVEL_OUTOF10: 2

## 2025-03-01 NOTE — ED NOTES
Discharge instruction reviewed by CARMEN Pedraza with the patient.  The patient verbalized understanding. Patient provided with AVS.  Patient provided with all her imaging and lab results from today's visit.     Patient is ambulatory and steady gait upon discharge. Patient is AAOX4, breathing even and unlabored, skin warm and dry, skin intact.    Patient mobility status  with no difficulty. Provider aware     Patient left ED via Discharge Method: ambulatory to Home with family.    Opportunity for questions and clarification provided.     Patient given 0 paper scripts.

## 2025-03-01 NOTE — ED PROVIDER NOTES
[facility] EMERGENCY DEPARTMENT  EMERGENCY DEPARTMENT ENCOUNTER      Pt Name: Manisha Kennedy  MRN: [id number]  Birthdate 1950  Date of evaluation: 3/1/2025  Provider: Matt Buckner DO    CHIEF COMPLAINT       Chief Complaint   Patient presents with    Chest Pain    Shortness of Breath         HISTORY OF PRESENT ILLNESS   (Location/Symptom, Timing/Onset, Context/Setting, Quality, Duration, Modifying Factors, Severity)  Note limiting factors.   74-year-old female comes in with myriad of complaints.  She states over the last several weeks she has had a lot of intermittent chest discomfort some odd feeling in her arm that she describes as numb but does not describe loss of sensation.  She has also had some chest fluttering.  No shortness of breath fevers or chills.  She states that 2 years ago she thinks she had a heart attack after surgery as she had some chest pain in her mid sternum that went out concentrically the night after she got home but never had pain again after that.  She states that the pain she is having started like that several days ago.  It seems to be intermittent.  No pain currently            Review of External Medical Records:     Nursing Notes were reviewed.    REVIEW OF SYSTEMS    (2-9 systems for level 4, 10 or more for level 5)     Review of Systems    Except as noted above the remainder of the review of systems was reviewed and negative.       PAST MEDICAL HISTORY     Past Medical History:   Diagnosis Date    Back pain     Chronic pain     lower back and into left hip    Phlebitis     DURING  PREGNANCY          SURGICAL HISTORY       Past Surgical History:   Procedure Laterality Date    COLONOSCOPY N/A 8/20/2018    COLONOSCOPY performed by Baldemar Reed MD at [facility] ENDOSCOPY    CYST INCISION AND DRAINAGE Right 2000    LUMBAR LAMINECTOMY  1995    L5-S1    REFRACTIVE SURGERY      REMOVE LUMBAR ARTIFICIAL DISC, SINGLE      TONSILLECTOMY AND ADENOIDECTOMY      TUBAL LIGATION  1976       Matt Buckner, DO  03/01/25 2243

## 2025-03-01 NOTE — ED TRIAGE NOTES
Pt arrives to the ED with c/o chest pain, palpitations , SOB that has been ongoing for the past couple weeks. Left arm numbness and bilateral leg pain that started last Saturday. Also states she has lost 20 lb over the past 6 years.

## 2025-03-02 LAB
EKG ATRIAL RATE: 57 BPM
EKG DIAGNOSIS: NORMAL
EKG P AXIS: 81 DEGREES
EKG P-R INTERVAL: 160 MS
EKG Q-T INTERVAL: 428 MS
EKG QRS DURATION: 130 MS
EKG QTC CALCULATION (BAZETT): 416 MS
EKG R AXIS: 87 DEGREES
EKG T AXIS: 34 DEGREES
EKG VENTRICULAR RATE: 57 BPM

## 2025-03-02 PROCEDURE — 93010 ELECTROCARDIOGRAM REPORT: CPT | Performed by: SPECIALIST

## (undated) DEVICE — COVER LT HNDL PLAS RIG 1 PER PK

## (undated) DEVICE — AIRLIFE™ U/CONNECT-IT OXYGEN TUBING 7 FEET (2.1 M) CRUSH-RESISTANT OXYGEN TUBING, VINYL TIPPED: Brand: AIRLIFE™

## (undated) DEVICE — KIT IV STRT W CHLORAPREP PD 1ML

## (undated) DEVICE — SUTURE VCRL SZ 0 L18IN ABSRB VLT L36MM CT-1 1/2 CIR J740D

## (undated) DEVICE — SYR 50ML SLIP TIP NSAF LF STRL --

## (undated) DEVICE — SPONGE GZ W4XL4IN COT 12 PLY TYP VII WVN C FLD DSGN

## (undated) DEVICE — FORCEPS BX L240CM JAW DIA2.8MM L CAP W/ NDL MIC MESH TOOTH

## (undated) DEVICE — BAG SPEC BIOHZD LF 2MIL 6X10IN -- CONVERT TO ITEM 357326

## (undated) DEVICE — BONE WAX WHITE: Brand: BONE WAX WHITE

## (undated) DEVICE — C-ARM: Brand: UNBRANDED

## (undated) DEVICE — CUFF BLD PRSS CHILD SZ 9 FOR 15-21CM LIMB VYN SFT W/O TB

## (undated) DEVICE — COVER,TABLE,HEAVY DUTY,60"X90",STRL: Brand: MEDLINE

## (undated) DEVICE — CONNECTOR TBNG AUX H2O JET DISP FOR OLY 160/180 SER

## (undated) DEVICE — SYRINGE MED 20ML STD CLR PLAS LUERLOCK TIP N CTRL DISP

## (undated) DEVICE — GLOVE SURG SZ 65 L12IN FNGR THK94MIL STD WHT LTX FREE

## (undated) DEVICE — SOLUTION IRRIG 1000ML 0.9% SOD CHL USP POUR PLAS BTL

## (undated) DEVICE — CANNULA INJ L2.5IN BLNT TIP 3MM CLR BODY W/ 1 W VLV DLP

## (undated) DEVICE — STERILE-Z SURGICAL PATIENT COVERS CLEAR POLYETHYLENE STERILE UNIVERSAL FIT 10 PER CASE: Brand: STERILE-Z

## (undated) DEVICE — Z DISCONTINUED NO SUB IDED SET EXTN W/ 4 W STPCOCK M SPIN LOK 36IN

## (undated) DEVICE — Z DISCONTINUED USE 2751540 TUBING IRRIG L10IN DISP PMP ENDOGATOR

## (undated) DEVICE — KENDALL RADIOLUCENT FOAM MONITORING ELECTRODE -RECTANGULAR SHAPE: Brand: KENDALL

## (undated) DEVICE — GLOVE SURG SZ 65 L12IN FNGR THK79MIL GRN LTX FREE

## (undated) DEVICE — LAMINECTOMY-SMH: Brand: MEDLINE INDUSTRIES, INC.

## (undated) DEVICE — SNARE ENDOSCP M L240CM W27MM SHTH DIA2.4MM CHN 2.8MM OVL

## (undated) DEVICE — 1200 GUARD II KIT W/5MM TUBE W/O VAC TUBE: Brand: GUARDIAN

## (undated) DEVICE — CONTAINER SPEC 20 ML LID NEUT BUFF FORMALIN 10 % POLYPR STS

## (undated) DEVICE — FLOSEAL HEMOSTATIC MATRIX, 10ML: Brand: FLOSEAL HEMOSTATIC MATRIX

## (undated) DEVICE — GOWN,ECLIPSE,FABRNF,XL,30/CS: Brand: MEDLINE

## (undated) DEVICE — BAG BELONG PT PERS CLEAR HANDL

## (undated) DEVICE — CATH IV AUTOGRD BC BLU 22GA 25 -- INSYTE

## (undated) DEVICE — TOTAL TRAY, 16FR 10ML SIL FOLEY, URN: Brand: MEDLINE

## (undated) DEVICE — GLOVE ORANGE PI 7 1/2   MSG9075

## (undated) DEVICE — SET ADMIN 16ML TBNG L100IN 2 Y INJ SITE IV PIGGY BK DISP

## (undated) DEVICE — BW-412T DISP COMBO CLEANING BRUSH: Brand: SINGLE USE COMBINATION CLEANING BRUSH

## (undated) DEVICE — SPONGE GZ W4XL4IN COT RADPQ HIGHLY ABSRB

## (undated) DEVICE — WRISTBAND ID AD W1XL11.5IN RED POLY ALRG PREPRINTED PERM

## (undated) DEVICE — INTENDED FOR TISSUE SEPARATION, AND OTHER PROCEDURES THAT REQUIRE A SHARP SURGICAL BLADE TO PUNCTURE OR CUT.: Brand: BARD-PARKER ® CARBON RIB-BACK BLADES

## (undated) DEVICE — NEEDLE HYPO 18GA L1.5IN PNK S STL HUB POLYPR SHLD REG BVL

## (undated) DEVICE — QUILTED PREMIUM COMFORT UNDERPAD,EXTRA HEAVY: Brand: WINGS

## (undated) DEVICE — NEONATAL-ADULT SPO2 SENSOR: Brand: NELLCOR

## (undated) DEVICE — GAUZE BORDERED 4X8 --

## (undated) DEVICE — POSITIONER HD REST FOAM CMFRT TCH

## (undated) DEVICE — ENDO CARRY-ON PROCEDURE KIT INCLUDES ENZYMATIC SPONGE, GAUZE, BIOHAZARD LABEL, TRAY, LUBRICANT, DIRTY SCOPE LABEL, WATER LABEL, TRAY, DRAWSTRING PAD, AND DEFENDO 4-PIECE KIT.: Brand: ENDO CARRY-ON PROCEDURE KIT

## (undated) DEVICE — TOOL 14MH30 LEGEND 14CM 3MM: Brand: MIDAS REX ™

## (undated) DEVICE — SYR 10ML LUER LOK 1/5ML GRAD --

## (undated) DEVICE — CANN NASAL O2 CAPNOGRAPHY AD -- FILTERLINE

## (undated) DEVICE — TRAP SURG QUAD PARABOLA SLOT DSGN SFTY SCRN TRAPEASE

## (undated) DEVICE — SOLIDIFIER FLUID 3000 CC ABSORB

## (undated) DEVICE — DRAPE C-ARMOUR C-ARM KIT --

## (undated) DEVICE — SUTURE MCRYL SZ 4-0 L27IN ABSRB UD L19MM PS-2 1/2 CIR PRIM Y426H

## (undated) DEVICE — SUTURE VCRL SZ 2-0 L18IN ABSRB UD L26MM CP-2 1/2 CIR REV J762D

## (undated) DEVICE — BIPOLAR IRRIGATOR INTEGRATED TUBING AND BIPOLAR CORD SET, DISPOSABLE

## (undated) DEVICE — SOLUTION IRRIG 1000ML STRL H2O USP PLAS POUR BTL

## (undated) DEVICE — SPHERE STEALTH 12PK/TY --

## (undated) DEVICE — SOLUTION IV 250ML 0.9% SOD CHL CLR INJ FLX BG CONT PRT CLSR